# Patient Record
Sex: FEMALE | Race: WHITE | Employment: OTHER | URBAN - METROPOLITAN AREA
[De-identification: names, ages, dates, MRNs, and addresses within clinical notes are randomized per-mention and may not be internally consistent; named-entity substitution may affect disease eponyms.]

---

## 2017-03-28 ENCOUNTER — OFFICE VISIT (OUTPATIENT)
Dept: FAMILY MEDICINE CLINIC | Age: 62
End: 2017-03-28

## 2017-03-28 VITALS
BODY MASS INDEX: 20.99 KG/M2 | SYSTOLIC BLOOD PRESSURE: 110 MMHG | TEMPERATURE: 98.3 F | WEIGHT: 126 LBS | DIASTOLIC BLOOD PRESSURE: 68 MMHG | HEIGHT: 65 IN

## 2017-03-28 DIAGNOSIS — L98.9 PAINFUL SKIN LESION: Primary | ICD-10-CM

## 2017-03-28 PROCEDURE — 99213 OFFICE O/P EST LOW 20 MIN: CPT | Performed by: FAMILY MEDICINE

## 2017-03-28 RX ORDER — GLUCOSAMINE HCL 500 MG
TABLET ORAL EVERY EVENING
COMMUNITY
End: 2021-04-23 | Stop reason: ALTCHOICE

## 2017-03-28 RX ORDER — CEPHALEXIN 500 MG/1
500 CAPSULE ORAL 3 TIMES DAILY
Qty: 21 CAPSULE | Refills: 0 | Status: SHIPPED | OUTPATIENT
Start: 2017-03-28 | End: 2017-04-04

## 2017-07-24 ENCOUNTER — TELEPHONE (OUTPATIENT)
Dept: CASE MANAGEMENT | Age: 62
End: 2017-07-24

## 2018-07-31 ENCOUNTER — OFFICE VISIT (OUTPATIENT)
Dept: FAMILY MEDICINE CLINIC | Age: 63
End: 2018-07-31

## 2018-07-31 VITALS
TEMPERATURE: 98.6 F | BODY MASS INDEX: 19.09 KG/M2 | HEART RATE: 84 BPM | HEIGHT: 65 IN | WEIGHT: 114.6 LBS | DIASTOLIC BLOOD PRESSURE: 74 MMHG | SYSTOLIC BLOOD PRESSURE: 132 MMHG

## 2018-07-31 DIAGNOSIS — Z12.11 COLON CANCER SCREENING: ICD-10-CM

## 2018-07-31 DIAGNOSIS — Z12.2 ENCOUNTER FOR SCREENING FOR LUNG CANCER: ICD-10-CM

## 2018-07-31 DIAGNOSIS — H26.9 CATARACT OF BOTH EYES, UNSPECIFIED CATARACT TYPE: ICD-10-CM

## 2018-07-31 DIAGNOSIS — Z87.891 PERSONAL HISTORY OF TOBACCO USE: ICD-10-CM

## 2018-07-31 DIAGNOSIS — E78.2 MIXED HYPERLIPIDEMIA: ICD-10-CM

## 2018-07-31 DIAGNOSIS — Z72.0 TOBACCO ABUSE: ICD-10-CM

## 2018-07-31 DIAGNOSIS — E04.1 THYROID NODULE: ICD-10-CM

## 2018-07-31 DIAGNOSIS — Z01.818 PREOP EXAMINATION: Primary | ICD-10-CM

## 2018-07-31 PROCEDURE — 99242 OFF/OP CONSLTJ NEW/EST SF 20: CPT | Performed by: FAMILY MEDICINE

## 2018-07-31 PROCEDURE — G0296 VISIT TO DETERM LDCT ELIG: HCPCS | Performed by: FAMILY MEDICINE

## 2018-07-31 ASSESSMENT — ENCOUNTER SYMPTOMS
NAUSEA: 0
VOICE CHANGE: 0
VOMITING: 0
EYE PAIN: 0
COUGH: 0
SORE THROAT: 0
ABDOMINAL DISTENTION: 0
SHORTNESS OF BREATH: 0
TROUBLE SWALLOWING: 0
ABDOMINAL PAIN: 0
CHEST TIGHTNESS: 0

## 2018-07-31 ASSESSMENT — PATIENT HEALTH QUESTIONNAIRE - PHQ9
SUM OF ALL RESPONSES TO PHQ QUESTIONS 1-9: 0
2. FEELING DOWN, DEPRESSED OR HOPELESS: 0
1. LITTLE INTEREST OR PLEASURE IN DOING THINGS: 0
SUM OF ALL RESPONSES TO PHQ9 QUESTIONS 1 & 2: 0

## 2018-07-31 NOTE — PROGRESS NOTES
Subjective:      Patient ID: Rabia Arroyo is a 58 y.o. female. Patient presents with:  Pre-op Exam: Cataract Surgery on 2018 & 8- by Dr. Emmanuel Blount at [facility]. fax [phone], [phone]    She is doing well and no concerns. She is having a problem with her vision because of the cataracts. Allergies:   -- Erythromycin -- Nausea And Vomiting     height is 5' 4.75\" (1.645 m) and weight is 114 lb 9.6 oz (52 kg). Her oral temperature is 98.6 °F (37 °C). Her blood pressure is 132/74 and her pulse is 84. Current smoker. ETOH seldom      Immunization History  Administered            Date(s) Administered    Influenza Virus Vaccine                          2013      Tdap (Boostrix, Adacel)                          2017        Current Outpatient Prescriptions:     TURMERIC PO, Take by mouth    Cholecalciferol (VITAMIN D3) 5000 UNITS TABS, Take by mouth Taking BID    calcium carbonate 600 MG TABS tablet, Take 1 tablet by mouth 2 times daily as needed    Nutritional Supplements (VITAMIN D MAINTENANCE PO), Take  by mouth daily    Past Surgical History:  : APPENDECTOMY  12: CARPAL TUNNEL RELEASE      Comment: right  12: CARPAL TUNNEL RELEASE      Comment: Left  :  SECTION  2006: COLONOSCOPY      Comment: zach dumas  2010: JOINT REPLACEMENT Right      Comment: right hip     No problems with anesthesia    PMH    Hx of colitis  Thyroid nodule             Review of Systems   Constitutional: Negative for appetite change, chills, fever and unexpected weight change. HENT: Negative for sore throat, trouble swallowing and voice change. No loose teeth. Top front teeth are crowns. In process of getting dental implants   Eyes: Negative for pain. Respiratory: Negative for cough, chest tightness and shortness of breath. No hemoptysis   Cardiovascular: Negative for chest pain, palpitations and leg swelling.         No hx of heart disease. She can do heavy work at home lifting boxes, moving furniture and no cp no sob. Gastrointestinal: Negative for abdominal distention, abdominal pain, nausea and vomiting. No dysphagia no gerd. She has hx of loose stools not new no blood   Genitourinary: Negative for difficulty urinating, dysuria, frequency and hematuria. Musculoskeletal:        Hx of neck pain not new. Neurological: Negative for syncope and headaches. No hx of CVA    Hematological: Negative for adenopathy. Does not bruise/bleed easily. No hx of blood clot  No family hx of bleeding       Objective:   Physical Exam   Constitutional: She appears well-developed and well-nourished. No distress. HENT:   Head: Normocephalic and atraumatic. Right Ear: Tympanic membrane and ear canal normal.   Left Ear: Tympanic membrane and ear canal normal.   Nose: Nose normal.   Mouth/Throat: Uvula is midline, oropharynx is clear and moist and mucous membranes are normal. No oral lesions. Eyes: No scleral icterus. Neck: Full passive range of motion without pain. Neck supple. No thyroid mass and no thyromegaly present. Cardiovascular: Normal rate, regular rhythm and normal heart sounds. Exam reveals no gallop and no friction rub. No murmur heard. No edema   Pulmonary/Chest: Effort normal and breath sounds normal. No accessory muscle usage. No tachypnea. No respiratory distress. She has no decreased breath sounds. She has no wheezes. She has no rhonchi. She has no rales. Abdominal: Soft. Normal appearance and bowel sounds are normal. She exhibits no distension, no abdominal bruit, no pulsatile midline mass and no mass. There is no hepatosplenomegaly. There is no tenderness. There is no rigidity and no guarding. Lymphadenopathy:        Head (right side): No submental, no submandibular, no preauricular and no posterior auricular adenopathy present.         Head (left side): No submental, no submandibular, no preauricular and no posterior auricular adenopathy present. She has no cervical adenopathy. Right: No supraclavicular adenopathy present. Left: No supraclavicular adenopathy present. Neurological: She is alert. She displays no tremor. Skin: Skin is warm, dry and intact. She is not diaphoretic. No cyanosis. No pallor. Nails show no clubbing. Psychiatric: She has a normal mood and affect. Her speech is normal.       Assessment:        Diagnosis Orders   1. Preop examination     2. Cataract of both eyes, unspecified cataract type     3. Mixed hyperlipidemia     4. Thyroid nodule     5. Colon cancer screening     6. Tobacco abuse     7. Encounter for screening for lung cancer     8. Personal history of tobacco use  NE VISIT TO DISCUSS LUNG CA SCREEN W LDCT    CT Lung Screening (Annual)       She is ok for the surgery  She is overdue for testing and monitoring      Plan:      Do see dr Tim Hicks for the recheck on the colon  Stop the tobacco  Consider getting the new shingle vaccine called Shingrix. You can do this at the pharmacies  Get the screening cat scan of the chest this year  See dr Mason Smith for a check on the history of the thyroid nodule  Do get back in to the gynecologist for your check up and mammograms          Low Dose CT (LDCT) Lung Screening criteria met   Age 55-77   Pack year smoking >30   Still smoking or less than 15 year since quit   No sign or symptoms of lung cancer   > 11 months since last LDCT     Risks and benefits of lung cancer screening with LDCT scans discussed:    Significance of positive screen - False-positive LDCT results often occur. 95% of all positive results do not lead to a diagnosis of cancer. Usually further imaging can resolve most false-positive results; however, some patients may require invasive procedures.     Over diagnosis risk - 10% to 12% of screen-detected lung cancer cases are over diagnosedthat is, the cancer would not have been detected in the

## 2018-10-01 ENCOUNTER — OFFICE VISIT (OUTPATIENT)
Dept: ORTHOPEDIC SURGERY | Age: 63
End: 2018-10-01
Payer: COMMERCIAL

## 2018-10-01 VITALS
HEART RATE: 98 BPM | BODY MASS INDEX: 19.09 KG/M2 | WEIGHT: 114.6 LBS | HEIGHT: 65 IN | DIASTOLIC BLOOD PRESSURE: 76 MMHG | TEMPERATURE: 99.7 F | RESPIRATION RATE: 16 BRPM | SYSTOLIC BLOOD PRESSURE: 133 MMHG

## 2018-10-01 DIAGNOSIS — Z96.641 HISTORY OF TOTAL RIGHT HIP REPLACEMENT: Primary | ICD-10-CM

## 2018-10-01 PROCEDURE — 99213 OFFICE O/P EST LOW 20 MIN: CPT | Performed by: PHYSICIAN ASSISTANT

## 2018-10-01 NOTE — PROGRESS NOTES
This dictation was done with Bernal Filmson dictation and may contain mechanical errors related to translation. Blood pressure 133/76, pulse 98, temperature 99.7 °F (37.6 °C), temperature source Temporal, resp. rate 16, height 5' 4.75\" (1.645 m), weight 114 lb 9.6 oz (52 kg), not currently breastfeeding. this is a pleasant 77-year-old female who is here in follow-up for her right hip she had a surgery almost 9 years ago had been doing fairly well is very active. She is up and take care of her son has P TSD she also walks her dogs for a she's been moving a lot of ice and the repetitive motion has caused a flareup in her back down her hip sometimes into her groin area is concerning enough that she is here for evaluation and treatment center for x-rays including a AP pelvis and a 2 view right hip. The x-rays show excellent alignment sizing and fit of the prosthesis good fixation of the screws the balls well centered in the cup there is no stress reaction seen in the femur there is some mild osteoarthritis in the lateral aspect of the left hip and through the base of the lumbar spine    On examination this pleasant 4 female in no acute distress she has full range of motion through both hips comfortable internal and external rotation good hip flexion and abduction without pain neurologically she is intact to her right foot with a negative clonus negative for straight leg raise at 40° she has intact dorsal pedal pulses no cutaneous lesions or lymphadenopathy are well-healed incision.     My impression is stable right total hip replacement with some mild radiculopathy and muscle weakness    I did recommend protein supplements and an exercise program even yoga type activities 100 with her overall condition of her right hip and she'll follow up with us in a year

## 2019-03-05 ENCOUNTER — OFFICE VISIT (OUTPATIENT)
Dept: FAMILY MEDICINE CLINIC | Age: 64
End: 2019-03-05
Payer: COMMERCIAL

## 2019-03-05 VITALS
TEMPERATURE: 98.4 F | DIASTOLIC BLOOD PRESSURE: 78 MMHG | WEIGHT: 120 LBS | BODY MASS INDEX: 19.99 KG/M2 | HEART RATE: 76 BPM | HEIGHT: 65 IN | SYSTOLIC BLOOD PRESSURE: 132 MMHG

## 2019-03-05 DIAGNOSIS — Z12.2 ENCOUNTER FOR SCREENING FOR LUNG CANCER: ICD-10-CM

## 2019-03-05 DIAGNOSIS — B07.8 OTHER VIRAL WARTS: ICD-10-CM

## 2019-03-05 DIAGNOSIS — Z87.891 PERSONAL HISTORY OF TOBACCO USE: ICD-10-CM

## 2019-03-05 DIAGNOSIS — M81.0 OSTEOPOROSIS WITHOUT CURRENT PATHOLOGICAL FRACTURE, UNSPECIFIED OSTEOPOROSIS TYPE: ICD-10-CM

## 2019-03-05 DIAGNOSIS — Z12.11 COLON CANCER SCREENING: ICD-10-CM

## 2019-03-05 DIAGNOSIS — Z00.00 WELL ADULT EXAM: Primary | ICD-10-CM

## 2019-03-05 PROCEDURE — 99396 PREV VISIT EST AGE 40-64: CPT | Performed by: FAMILY MEDICINE

## 2019-03-05 RX ORDER — GLUCOSAMINE SULFATE 500 MG
500 CAPSULE ORAL
COMMUNITY
End: 2020-12-10

## 2019-03-05 RX ORDER — PREDNISONE 10 MG/1
TABLET ORAL
COMMUNITY
Start: 2019-02-26 | End: 2020-12-10

## 2019-03-05 ASSESSMENT — ENCOUNTER SYMPTOMS
BLOOD IN STOOL: 0
CHEST TIGHTNESS: 0
VOMITING: 0
ABDOMINAL DISTENTION: 0
CONSTIPATION: 0
SHORTNESS OF BREATH: 0
TROUBLE SWALLOWING: 0
DIARRHEA: 0
COUGH: 0
SORE THROAT: 0
ABDOMINAL PAIN: 0
NAUSEA: 0
VOICE CHANGE: 0

## 2020-12-10 ENCOUNTER — APPOINTMENT (OUTPATIENT)
Dept: GENERAL RADIOLOGY | Age: 65
End: 2020-12-10
Payer: MEDICARE

## 2020-12-10 ENCOUNTER — HOSPITAL ENCOUNTER (EMERGENCY)
Age: 65
Discharge: HOME | End: 2020-12-10
Attending: STUDENT IN AN ORGANIZED HEALTH CARE EDUCATION/TRAINING PROGRAM
Payer: MEDICARE

## 2020-12-10 VITALS
DIASTOLIC BLOOD PRESSURE: 76 MMHG | SYSTOLIC BLOOD PRESSURE: 121 MMHG | BODY MASS INDEX: 19.72 KG/M2 | HEIGHT: 65 IN | RESPIRATION RATE: 16 BRPM | OXYGEN SATURATION: 100 % | HEART RATE: 71 BPM | TEMPERATURE: 97 F | WEIGHT: 118.39 LBS

## 2020-12-10 PROCEDURE — 6370000000 HC RX 637 (ALT 250 FOR IP): Performed by: STUDENT IN AN ORGANIZED HEALTH CARE EDUCATION/TRAINING PROGRAM

## 2020-12-10 PROCEDURE — 99284 EMERGENCY DEPT VISIT MOD MDM: CPT

## 2020-12-10 PROCEDURE — 29515 APPLICATION SHORT LEG SPLINT: CPT

## 2020-12-10 PROCEDURE — 73610 X-RAY EXAM OF ANKLE: CPT

## 2020-12-10 RX ORDER — HYDROCODONE BITARTRATE AND ACETAMINOPHEN 5; 325 MG/1; MG/1
1 TABLET ORAL ONCE
Status: COMPLETED | OUTPATIENT
Start: 2020-12-10 | End: 2020-12-10

## 2020-12-10 RX ORDER — TRAMADOL HYDROCHLORIDE 50 MG/1
50 TABLET ORAL EVERY 6 HOURS PRN
Qty: 12 TABLET | Refills: 0 | Status: SHIPPED | OUTPATIENT
Start: 2020-12-10 | End: 2020-12-13

## 2020-12-10 RX ADMIN — HYDROCODONE BITARTRATE AND ACETAMINOPHEN 1 TABLET: 5; 325 TABLET ORAL at 16:20

## 2020-12-10 ASSESSMENT — PAIN DESCRIPTION - PAIN TYPE
TYPE: ACUTE PAIN
TYPE: ACUTE PAIN

## 2020-12-10 ASSESSMENT — PAIN DESCRIPTION - DESCRIPTORS
DESCRIPTORS: ACHING
DESCRIPTORS: ACHING

## 2020-12-10 ASSESSMENT — PAIN DESCRIPTION - LOCATION
LOCATION: ANKLE
LOCATION: ANKLE

## 2020-12-10 ASSESSMENT — PAIN DESCRIPTION - ORIENTATION
ORIENTATION: RIGHT
ORIENTATION: RIGHT

## 2020-12-10 ASSESSMENT — PAIN SCALES - GENERAL
PAINLEVEL_OUTOF10: 5
PAINLEVEL_OUTOF10: 1
PAINLEVEL_OUTOF10: 3

## 2020-12-10 NOTE — ED TRIAGE NOTES
Pt. Twisted right ankle when [place] park, swelling noted of right ankle, strong pedal pulse present. Ice applied to ankle, has not stepped on foot since injury.

## 2020-12-11 ENCOUNTER — ANESTHESIA EVENT (OUTPATIENT)
Dept: OPERATING ROOM | Age: 65
End: 2020-12-11
Payer: MEDICARE

## 2020-12-11 ENCOUNTER — TELEPHONE (OUTPATIENT)
Dept: ORTHOPEDIC SURGERY | Age: 65
End: 2020-12-11

## 2020-12-11 ENCOUNTER — HOSPITAL ENCOUNTER (OUTPATIENT)
Dept: CT IMAGING | Age: 65
Discharge: HOME | End: 2020-12-11
Payer: MEDICARE

## 2020-12-11 ENCOUNTER — OFFICE VISIT (OUTPATIENT)
Dept: ORTHOPEDIC SURGERY | Age: 65
End: 2020-12-11
Payer: MEDICARE

## 2020-12-11 VITALS — WEIGHT: 118 LBS | TEMPERATURE: 97.3 F | BODY MASS INDEX: 19.66 KG/M2 | HEIGHT: 65 IN

## 2020-12-11 PROBLEM — F17.200 CURRENT SMOKER: Status: ACTIVE | Noted: 2020-12-11

## 2020-12-11 PROCEDURE — 99406 BEHAV CHNG SMOKING 3-10 MIN: CPT | Performed by: ORTHOPAEDIC SURGERY

## 2020-12-11 PROCEDURE — 99203 OFFICE O/P NEW LOW 30 MIN: CPT | Performed by: ORTHOPAEDIC SURGERY

## 2020-12-11 PROCEDURE — 73700 CT LOWER EXTREMITY W/O DYE: CPT

## 2020-12-11 RX ORDER — M-VIT,TX,IRON,MINS/CALC/FOLIC 27MG-0.4MG
1 TABLET ORAL DAILY
COMMUNITY

## 2020-12-11 RX ORDER — OXYCODONE HYDROCHLORIDE AND ACETAMINOPHEN 5; 325 MG/1; MG/1
1 TABLET ORAL EVERY 6 HOURS PRN
Qty: 20 TABLET | Refills: 0 | Status: SHIPPED | OUTPATIENT
Start: 2020-12-11 | End: 2020-12-16

## 2020-12-11 NOTE — ED PROVIDER NOTES
[address]      Pt Name: German Perez  MRN: [id number]  [place] 1955  Date of evaluation: 12/10/2020  Provider: Henry Arriaga MD    CHIEF COMPLAINT       Chief Complaint   Patient presents with    Ankle Injury     right ankle injury when twisted ankle when getting out of the car         HISTORY OF PRESENT ILLNESS   (Location/Symptom, Timing/Onset,Context/Setting, Quality, Duration, Modifying Factors, Severity)  Note limiting factors. German Perez is a 76 y.o. female who presents to the emergency department c/o R ankle pain x 15 min. Onset sudden, pt was exiting the passenger side of a vehicle when she twisted her ankle and fell. Denies head trauma, denies LOC, denies neck pain. Ankle pain was immediate in onset and persistent with associated swelling and decreased ROM 2/2 pain. Denies sensory change/loss. Exacerbated by movement and alleviated by immobilization. Symptoms not otherwise alleviated or exacerbated by other factors. NursingNotes were reviewed. REVIEW OF SYSTEMS    (2-9 systems for level 4, 10 or more for level 5)       Constitutional: No fever or chills. Eye: No visual disturbances. No eye pain. Ear/Nose/Mouth/Throat: No nasal congestion. No sore throat. Respiratory: No cough, No shortness of breath, No sputum production. Cardiovascular: No chest pain. No palpitations. Gastrointestinal: No abdominal pain. No nausea or vomiting  Genitourinary: No dysuria. No hematuria. Hematology/Lymphatics: No bleeding or bruising tendency. Immunologic: No malaise. No swollen glands. Musculoskeletal: No back pain. + joint pain. Integumentary: No rash. No abrasions. Neurologic: No headache. No focal numbness or weakness.       PAST MEDICAL HISTORY     Past Medical History:   Diagnosis Date    Colitis     microscopic         SURGICALHISTORY       Past Surgical History:   Procedure Laterality Date    APPENDECTOMY  2000  CARPAL TUNNEL RELEASE  12    right    CARPAL TUNNEL RELEASE  12    Left     SECTION  1982    COLONOSCOPY  2006    piter, zach    DENTAL SURGERY      JOINT REPLACEMENT Right 2010    right hip          CURRENT MEDICATIONS       Discharge Medication List as of 12/10/2020  5:56 PM      CONTINUE these medications which have NOT CHANGED    Details   Cyanocobalamin (VITAMIN B 12 PO) Take by mouthHistorical Med      Coenzyme Q10 (CO Q 10 PO) Take by mouthHistorical Med      MAGNESIUM PO Take by mouthHistorical Med      Cholecalciferol (VITAMIN D3) 5000 UNITS TABS Take by mouth Taking BIDHistorical Med      Nutritional Supplements (VITAMIN D MAINTENANCE PO) Take  by mouth daily.                ALLERGIES     Erythromycin    FAMILY HISTORY       Family History   Problem Relation Age of Onset    Cancer Other     Diabetes Other     Heart Disease Other     Hypertension Other           SOCIAL HISTORY       Social History     Socioeconomic History    Marital status:      Spouse name: None    Number of children: None    Years of education: None    Highest education level: None   Occupational History    Occupation:    Social Needs    Financial resource strain: None    Food insecurity     Worry: None     Inability: None    Transportation needs     Medical: None     Non-medical: None   Tobacco Use    Smoking status: Current Every Day Smoker     Packs/day: 1.00     Years: 46.00     Pack years: 46.00     Types: Cigarettes     Start date: 1972    Smokeless tobacco: Never Used   Substance and Sexual Activity    Alcohol use: Yes     Comment: seldom    Drug use: No    Sexual activity: None   Lifestyle    Physical activity     Days per week: None     Minutes per session: None    Stress: None   Relationships    Social connections     Talks on phone: None     Gets together: None     Attends Roman [employer] service: None     Active member of club or organization: None Attends meetings of clubs or organizations: None     Relationship status: None    Intimate partner violence     Fear of current or ex partner: None     Emotionally abused: None     Physically abused: None     Forced sexual activity: None   Other Topics Concern    None   Social History Narrative    None       SCREENINGS             PHYSICAL EXAM    (up to 7 for level 4, 8 or more for level 5)     ED Triage Vitals [12/10/20 1601]   BP Temp Temp Source Pulse Resp SpO2 Height Weight   121/64 97 °F (36.1 °C) Oral 67 16 100 % 5' 4.75\" (1.645 m) 118 lb 6.2 oz (53.7 kg)       General: Alert and oriented appropriately for age, No acute distress. Eye: Normal conjunctiva. Pupils equal and reactive. HENT: Oral mucosa is moist.  Respiratory: Respirations even and non-labored. Cardiovascular: Normal rate, Regular rhythm. Gastrointestinal: Soft, Non-tender, Non-distended. Musculoskeletal: + R ankle joint ttp and signficant associated swelling. Compartments otherwise soft and compressible, decreased ankle ROM 2/2 pain. Distal foot and toes pink and well perfused. DP and PT 2+ and equal to c/l extremity. SILT throughout. EHL/FHL 5/5. Fires GS/TA. Integumentary: Warm, Dry. Neurologic: Alert and appropriate for age. No focal deficits. Psychiatric: Cooperative. DIAGNOSTIC RESULTS     RADIOLOGY:   Non-plain filmimages such as CT, Ultrasound and MRI are read by the radiologist. Plain radiographic images are visualized and preliminarily interpreted by the emergency physician with the below findings:      Interpretation per the Radiologist below, if available at the time ofthis note:    XR ANKLE RIGHT (MIN 3 VIEWS)   Final Result   Trimalleolar fracture.                  EMERGENCY DEPARTMENT COURSE and DIFFERENTIAL DIAGNOSIS/MDM:   Vitals:    Vitals:    12/10/20 1601 12/10/20 1708   BP: 121/64 121/76   Pulse: 67 71   Resp: 16 16   Temp: 97 °F (36.1 °C)    TempSrc: Oral    SpO2: 100% 100%   Weight: 118 lb 6.2 oz (53.7 kg) Height: 5' 4.75\" (1.645 m)          Medical decision makin yo F with R ankle pain, swelling and decreased ROM s/p fall. Closed injury, NVI. HDS without other e/o trauma. C/f ankle fx. No ttp along the proximal fibula to suggest [place]. Getting R ankle XR. Pain control with med below. Medications   HYDROcodone-acetaminophen (NORCO) 5-325 MG per tablet 1 tablet (1 tablet Oral Given 12/10/20 1620)     Pain improved with meds and splinting of her trimalleolar ankle fx. Consulted ortho who agreed with plan for splinting and next day follow up. Pt given next day follow up with Dr. Serafin Hess. Educated on signs and sxs of compartment syndrome and neurovascular compromise in lay terms and given return precautions, voiced understanding. Recommended stop excedrin for potential upcoming surgery and given Rx tramadol for pain. Given NWB precautions RLE. Able to ambulate steadily with walker in the ED and maintain NWB precautions. D/c home with prompt ortho follow up. CONSULTS:  IP CONSULT TO ORTHOPEDIC SURGERY    PROCEDURES:  Splint Application    Date/Time: 12/10/2020 10:31 PM  Performed by: Mar Pate MD  Authorized by: Mar Pate MD     Consent:     Consent obtained:  Verbal    Consent given by:  Patient    Risks discussed:  Discoloration, numbness, pain and swelling    Alternatives discussed:  No treatment  Pre-procedure details:     Sensation:  Normal    Skin color:  Pink and well perfused. Procedure details:     Laterality:  Right    Location:  Ankle    Ankle:  R ankle    Splint type:  Short leg    Supplies:  Ortho-Glass  Post-procedure details:     Pain:  Improved    Sensation:  Normal    Skin color:  Pink and well perfused. Patient tolerance of procedure: Tolerated well, no immediate complications               FINAL IMPRESSION      1.  Closed right trimalleolar fracture, initial encounter          DISPOSITION/PLAN   DISPOSITION Decision To Discharge 12/10/2020 05:54:39 PM      PATIENT REFERRED TO:  Marlin Granger MD  [address] [address]  [phone]    In 1 week      Melisa Butler, Lacy [facility] [address] [facility], [address]  [address]  [phone]    In 1 day      [address]  [address] [address]  [phone]    If symptoms worsen      DISCHARGE MEDICATIONS:  Discharge Medication List as of 12/10/2020  5:56 PM      START taking these medications    Details   traMADol (ULTRAM) 50 MG tablet Take 1 tablet by mouth every 6 hours as needed for Pain for up to 3 days. Intended supply: 3 days.  Take lowest dose possible to manage pain, Disp-12 tablet,R-0Print                (Please note that portions of this note were completed with a voice recognition program.Efforts were made to edit the dictations but occasionally words are mis-transcribed.)    Raúl Mujica MD (electronically signed)  Attending Emergency Physician         Raúl Mujica MD  12/10/20 9032

## 2020-12-11 NOTE — LETTER
[address] [facility]  Surgery Scheduling Form:      DEMOGRAPHICS:                                                                                                              .    Patient Name:  Emi Cordova  Patient :  1955   Patient SS#:      Patient Phone:  [phone] (home) [phone] (work) Alt. Patient Phone:    Patient Address:  [facility] [address] [facility]    PCP:  Abby Mike MD  Insurance:    Payor/Plan Subscr  Sex Relation Sub. Ins. ID Effective Group Num   1. [address] [facility] 1955 Female  [id number]                                    [id number]   Insurance ID Number:  DIAGNOSIS & PROCEDURE:                                                                                            .    Diagnosis:   Right ankle trimalleolar fracture  H64.797X  Operation:  Open reduction internal fixation right ankle  [id number]  Location:  [place]  Surgeon:  Radha Shukla MD    SCHEDULING INFORMATION:                                                                                         .    Surgeon's Scheduling Instruction:  2020  Requested Date:   2020 OR Time:  1215 Patient Arrival Time:  8522  OR Time Required:  90  Minutes  Anesthesia:  General    SA Required:  Yes  Equipment:  haile  Mini C-Arm:  No    Standard C-Arm:  Yes  Status:  Outpatient    PAT Required:  Yes  Latex Allergy:  unknown    Defibrilator or Pacemaker:  unknown  Isolation Precautions:  unknown  Comments:                       Meghan Beltran MD 20   BILLING INFORMATION:                                                                                                    .    Procedure:       CPT Code Modifier                  Pre-Certification:

## 2020-12-11 NOTE — PROGRESS NOTES
C-Difficile admission screening and protocol:     * Admitted with diarrhea? no     *Prior history of C-Diff. In last 3 months?no     *Antibiotic use in the past 6-8 weeks? yes-dental work     *Prior hospitalization or nursing home in the last month?  no      Remember. Ivarabia Snare Elisa Snare Safety First! Call before you Fall    Preoperative Screening for Elective Surgery/Invasive Procedures While COVID-19 present in the community     Have you tested positive or have been told to self-isolate for COVID-19 like symptoms within the past 28 days? no   Do you currently have any of the following symptoms?no  o Fever >100.0 F or 99.9 F in immunocompromised patients? o New onset cough, shortness of breath or difficulty breathing?  o New onset sore throat, myalgia (muscle aches and pains), headache, loss of taste/smell or diarrhea?  Have you had a potential exposure to COVID-19 within the past 14 days by:  o Close contact with a confirmed case?no  o Close contact with a healthcare worker,  or essential infrastructure worker (grocery store, [employer], gas station, public utilities or transportation)?no  o Do you reside in a congregate setting such as; skilled nursing facility, adult home, correctional facility, homeless shelter or other institutional setting?no  o Have you had recent travel to a known COVID-19 hotspot? no    Indicate if the patient has a positive screen by answering yes to one or more of the above questions. Patients who test positive or screen positive prior to surgery or on the day of surgery should be evaluated in conjunction with the surgeon/proceduralist/anesthesiologist to determine the urgency of the procedure. [address]  time___1015_________        Surgery time____1215________    Take the following medications with a sip of water: Follow your MD/Surgeons pre-procedure instructions regarding your medications    Do not eat or drink anything after 12:00 midnight prior to your surgery. This includes water chewing gum, mints and ice chips. You may brush your teeth and gargle the morning of your surgery, but do not swallow the water     Please see your family doctor/pediatrician for a history and physical and/or concerning medications. Bring any test results/reports from your physicians office. If you are under the care of a heart doctor or specialist doctor, please be aware that you may be asked to them for clearance    You may be asked to stop blood thinners such as Coumadin, Plavix, Fragmin, Lovenox, etc., or any anti-inflammatories such as:  Aspirin, Ibuprofen, Advil, Naproxen prior to your surgery. We also ask that you stop any OTC medications such as fish oil, vitamin E, glucosamine, garlic, Multivitamins, COQ 10, etc.    We ask that you do not smoke 24 hours prior to surgery  We ask that you do not  drink any alcoholic beverages 24 hours prior to surgery     You must make arrangements for a responsible adult to take you home after your surgery. For your safety you will not be allowed to leave alone or drive yourself home. Your surgery will be cancelled if you do not have a ride home. Also for your safety, it is strongly suggested that someone stay with you the first 24 hours after your surgery. A parent or legal guardian must accompany a child scheduled for surgery and plan to stay at the hospital until the child is discharged. Please do not bring other children with you. For your comfort, please wear simple loose fitting clothing to the hospital.  Please do not bring valuables. Do not wear any make-up or nail polish on your fingers or toes      For your safety, please do not wear any jewelry or body piercing's on the day of surgery. All jewelry must be removed. If you have dentures, they will be removed before going to operating room. For your convenience, we will provide you with a container.     If you wear contact lenses or glasses, they will be removed, please bring a case for them. If you have a living will and a durable power of  for healthcare, please bring in a copy. As part of our patient safety program to minimize surgical site infections, we ask you to do the following:    · Please notify your surgeon if you develop any illness between         now and the  day of your surgery. · This includes a cough, cold, fever, sore throat, nausea,         or vomiting, and diarrhea, etc.  ·  Please notify your surgeon if you experience dizziness, shortness         of breath or blurred vision between now and the time of your surgery. Do not shave your operative site 96 hours prior to surgery. For face and neck surgery, men may use an electric razor 48 hours   prior to surgery. You may shower the night before surgery or the morning of   your surgery with an antibacterial soap. You will need to bring a photo ID and insurance card    [facility] has an onsite pharmacy, would you like to utilize our pharmacy     If you will be staying overnight and use a C-pap machine, please bring   your C-pap to hospital     Our goal is to provide you with excellent care, therefore, visitors will be limited to two(2) in the room at a time so that we may focus on providing this care for you. Please contact pre-admission testing if you have any further questions. [facility] phone number:  [phone] PAT fax number:  [phone]  Please note these are generalized instructions for all surgical cases, you may be provided with more specific instructions according to your surgery.

## 2020-12-11 NOTE — TELEPHONE ENCOUNTER
General Question     Subject: TRAMDOL  PATIENT STATES IT'S NOT WORKING WOULD LIKE SOMETHING ELSE  Patient and /or Facility Request: [address] [facility] Number: [phone]

## 2020-12-11 NOTE — PROGRESS NOTES
CHIEF COMPLAINT: Right ankle pain / trimalleolar fracture. DATE OF INJURY: 12/10/2020    HISTORY:  Ms. Marvin Del Real 76 y.o.  female presents today for the first visit for evaluation of a right ankle injury which occurred when she was walking and tripped out of the passenger side of the car door when she was trying to tee her dog. She rolled her right ankle and was then unable to bear weight. She was first seen and evaluated in [facility], where she was x-rayed, splinted and asked to f/u with Orthopedics. She is complaining of medial & lateral ankle pain and swelling. This is better with elevation and worse with bearing any wt. The pain is sharp and not radiating. No numbness or tingling sensation. Alleviating factors: rest. No other complaint. She is retired. She is a smoker, 1 pack/day.     Past Medical History:   Diagnosis Date    Colitis     microscopic       Past Surgical History:   Procedure Laterality Date    APPENDECTOMY      CARPAL TUNNEL RELEASE  12    right    CARPAL TUNNEL RELEASE  12    Left     SECTION      COLONOSCOPY  2006    zach dumas    DENTAL SURGERY      JOINT REPLACEMENT Right 2010    right hip        Social History     Socioeconomic History    Marital status:      Spouse name: Not on file    Number of children: Not on file    Years of education: Not on file    Highest education level: Not on file   Occupational History    Occupation:    Social Needs    Financial resource strain: Not on file    Food insecurity     Worry: Not on file     Inability: Not on file    Transportation needs     Medical: Not on file     Non-medical: Not on file   Tobacco Use    Smoking status: Current Every Day Smoker     Packs/day: 1.00     Years: 46.00     Pack years: 46.00     Types: Cigarettes     Start date: 1972    Smokeless tobacco: Never Used   Substance and Sexual Activity    Alcohol use: Yes     Comment: seldom  Drug use: No    Sexual activity: Not on file   Lifestyle    Physical activity     Days per week: Not on file     Minutes per session: Not on file    Stress: Not on file   Relationships    Social connections     Talks on phone: Not on file     Gets together: Not on file     Attends Sabianist service: Not on file     Active member of club or organization: Not on file     Attends meetings of clubs or organizations: Not on file     Relationship status: Not on file    Intimate partner violence     Fear of current or ex partner: Not on file     Emotionally abused: Not on file     Physically abused: Not on file     Forced sexual activity: Not on file   Other Topics Concern    Not on file   Social History Narrative    Not on file       Family History   Problem Relation Age of Onset    Cancer Other     Diabetes Other     Heart Disease Other     Hypertension Other        Current Outpatient Medications on File Prior to Visit   Medication Sig Dispense Refill    traMADol (ULTRAM) 50 MG tablet Take 1 tablet by mouth every 6 hours as needed for Pain for up to 3 days. Intended supply: 3 days. Take lowest dose possible to manage pain 12 tablet 0    Cyanocobalamin (VITAMIN B 12 PO) Take by mouth      Coenzyme Q10 (CO Q 10 PO) Take by mouth      MAGNESIUM PO Take by mouth      Cholecalciferol (VITAMIN D3) 5000 UNITS TABS Take by mouth Taking BID      Nutritional Supplements (VITAMIN D MAINTENANCE PO) Take  by mouth daily. No current facility-administered medications on file prior to visit. Pertinent items are noted in HPI  Review of systems reviewed from Patient History Form dated on 12/11/2020 and available in the patient's chart under the Media tab. PHYSICAL EXAMINATION:  Ms. Danis Giles is a very pleasant 76 y.o.  female who presents today in no acute distress, awake, alert, and oriented. She is well dressed, nourished and  groomed. Patient with normal affect.   Height is  5' 4.75\" Educated the patient regarding the hazards of smoking and that it harms their body in many ways. It increases the chance of developing heart disease, lung disease, cancer, and other health problems including poor bone and wound healing. The importance of smoking cessation for optimal bone and wound healing was stressed. This was communicated verbally, 5 Minutes.       Dionicia Frankel, MD

## 2020-12-11 NOTE — TELEPHONE ENCOUNTER
*Per [facility] he states okay to give percocet*        Please advise percocet 5-325 Q6 with quantity 20 pending

## 2020-12-11 NOTE — TELEPHONE ENCOUNTER
CT RIGHT ANKLE approved Auth# [id number]     Patient was informed of the Auth and sent down stairs for her STAT scheduled image

## 2020-12-14 ENCOUNTER — APPOINTMENT (OUTPATIENT)
Dept: GENERAL RADIOLOGY | Age: 65
End: 2020-12-14
Attending: ORTHOPAEDIC SURGERY
Payer: MEDICARE

## 2020-12-14 ENCOUNTER — ANESTHESIA (OUTPATIENT)
Dept: OPERATING ROOM | Age: 65
End: 2020-12-14
Payer: MEDICARE

## 2020-12-14 ENCOUNTER — HOSPITAL ENCOUNTER (OUTPATIENT)
Age: 65
Setting detail: OUTPATIENT SURGERY
Discharge: HOME | End: 2020-12-14
Attending: ORTHOPAEDIC SURGERY | Admitting: ORTHOPAEDIC SURGERY
Payer: MEDICARE

## 2020-12-14 VITALS
RESPIRATION RATE: 14 BRPM | DIASTOLIC BLOOD PRESSURE: 64 MMHG | OXYGEN SATURATION: 99 % | HEIGHT: 65 IN | TEMPERATURE: 97.6 F | BODY MASS INDEX: 19.49 KG/M2 | WEIGHT: 117 LBS | SYSTOLIC BLOOD PRESSURE: 114 MMHG | HEART RATE: 78 BPM

## 2020-12-14 VITALS
RESPIRATION RATE: 8 BRPM | DIASTOLIC BLOOD PRESSURE: 59 MMHG | TEMPERATURE: 95.9 F | OXYGEN SATURATION: 98 % | SYSTOLIC BLOOD PRESSURE: 121 MMHG

## 2020-12-14 LAB — SARS-COV-2, NAAT: NOT DETECTED

## 2020-12-14 PROCEDURE — 6370000000 HC RX 637 (ALT 250 FOR IP): Performed by: ANESTHESIOLOGY

## 2020-12-14 PROCEDURE — 7100000000 HC PACU RECOVERY - FIRST 15 MIN: Performed by: ORTHOPAEDIC SURGERY

## 2020-12-14 PROCEDURE — 2580000003 HC RX 258: Performed by: NURSE ANESTHETIST, CERTIFIED REGISTERED

## 2020-12-14 PROCEDURE — 27829 TREAT LOWER LEG JOINT: CPT | Performed by: NURSE PRACTITIONER

## 2020-12-14 PROCEDURE — C1713 ANCHOR/SCREW BN/BN,TIS/BN: HCPCS | Performed by: ORTHOPAEDIC SURGERY

## 2020-12-14 PROCEDURE — 2580000003 HC RX 258: Performed by: ANESTHESIOLOGY

## 2020-12-14 PROCEDURE — 6360000002 HC RX W HCPCS: Performed by: ORTHOPAEDIC SURGERY

## 2020-12-14 PROCEDURE — 3700000000 HC ANESTHESIA ATTENDED CARE: Performed by: ORTHOPAEDIC SURGERY

## 2020-12-14 PROCEDURE — 6360000002 HC RX W HCPCS: Performed by: NURSE ANESTHETIST, CERTIFIED REGISTERED

## 2020-12-14 PROCEDURE — 27823 TREATMENT OF ANKLE FRACTURE: CPT | Performed by: ORTHOPAEDIC SURGERY

## 2020-12-14 PROCEDURE — 3600000004 HC SURGERY LEVEL 4 BASE: Performed by: ORTHOPAEDIC SURGERY

## 2020-12-14 PROCEDURE — 73600 X-RAY EXAM OF ANKLE: CPT

## 2020-12-14 PROCEDURE — 27829 TREAT LOWER LEG JOINT: CPT | Performed by: ORTHOPAEDIC SURGERY

## 2020-12-14 PROCEDURE — 2720000010 HC SURG SUPPLY STERILE: Performed by: ORTHOPAEDIC SURGERY

## 2020-12-14 PROCEDURE — 2709999900 HC NON-CHARGEABLE SUPPLY: Performed by: ORTHOPAEDIC SURGERY

## 2020-12-14 PROCEDURE — 7100000010 HC PHASE II RECOVERY - FIRST 15 MIN: Performed by: ORTHOPAEDIC SURGERY

## 2020-12-14 PROCEDURE — 7100000001 HC PACU RECOVERY - ADDTL 15 MIN: Performed by: ORTHOPAEDIC SURGERY

## 2020-12-14 PROCEDURE — 27823 TREATMENT OF ANKLE FRACTURE: CPT | Performed by: NURSE PRACTITIONER

## 2020-12-14 PROCEDURE — 3600000014 HC SURGERY LEVEL 4 ADDTL 15MIN: Performed by: ORTHOPAEDIC SURGERY

## 2020-12-14 PROCEDURE — 6360000002 HC RX W HCPCS: Performed by: ANESTHESIOLOGY

## 2020-12-14 PROCEDURE — 3209999900 FLUORO FOR SURGICAL PROCEDURES

## 2020-12-14 PROCEDURE — 7100000011 HC PHASE II RECOVERY - ADDTL 15 MIN: Performed by: ORTHOPAEDIC SURGERY

## 2020-12-14 PROCEDURE — 2580000003 HC RX 258: Performed by: ORTHOPAEDIC SURGERY

## 2020-12-14 PROCEDURE — 3700000001 HC ADD 15 MINUTES (ANESTHESIA): Performed by: ORTHOPAEDIC SURGERY

## 2020-12-14 PROCEDURE — U0002 COVID-19 LAB TEST NON-CDC: HCPCS

## 2020-12-14 PROCEDURE — 2500000003 HC RX 250 WO HCPCS: Performed by: NURSE ANESTHETIST, CERTIFIED REGISTERED

## 2020-12-14 DEVICE — PLATE BNE L80MM 4 H R LAT DST PERIARTC FIBULAR S STL LOK: Type: IMPLANTABLE DEVICE | Site: ANKLE | Status: FUNCTIONAL

## 2020-12-14 DEVICE — SCREW BNE L14MM DIA2.7MM HEX HD DIA2.5MM CANC BIODUR 108C: Type: IMPLANTABLE DEVICE | Site: ANKLE | Status: FUNCTIONAL

## 2020-12-14 DEVICE — SCREW BNE L36MM DIA4MM CANC SELF DRL ST CANN NONLOCKING 1/3: Type: IMPLANTABLE DEVICE | Site: ANKLE | Status: FUNCTIONAL

## 2020-12-14 DEVICE — SCREW BNE L16MM DIA2.7MM HEX HD DIA2.5MM CANC BIODUR 108C: Type: IMPLANTABLE DEVICE | Site: ANKLE | Status: FUNCTIONAL

## 2020-12-14 DEVICE — IMPLANTABLE DEVICE: Type: IMPLANTABLE DEVICE | Site: ANKLE | Status: FUNCTIONAL

## 2020-12-14 DEVICE — SCREW BNE L18MM DIA2.7MM HEX HD DIA2.5MM CANC BIODUR 108C: Type: IMPLANTABLE DEVICE | Site: ANKLE | Status: FUNCTIONAL

## 2020-12-14 DEVICE — SCREW BNE L12MM DIA3.5MM HEX HD DIA2.7MM DST LAT PERIARTC S: Type: IMPLANTABLE DEVICE | Site: ANKLE | Status: FUNCTIONAL

## 2020-12-14 DEVICE — SCREW BNE L14MM DIA3.5MM HD DIA2.7MM CORT PERIARTC S STL ST: Type: IMPLANTABLE DEVICE | Site: ANKLE | Status: FUNCTIONAL

## 2020-12-14 DEVICE — SCREW BNE L50MM DIA3.5MM HD DIA2.7MM LNG PERIARTC ST: Type: IMPLANTABLE DEVICE | Site: ANKLE | Status: FUNCTIONAL

## 2020-12-14 RX ORDER — SODIUM CHLORIDE, SODIUM LACTATE, POTASSIUM CHLORIDE, CALCIUM CHLORIDE 600; 310; 30; 20 MG/100ML; MG/100ML; MG/100ML; MG/100ML
INJECTION, SOLUTION INTRAVENOUS CONTINUOUS PRN
Status: DISCONTINUED | OUTPATIENT
Start: 2020-12-14 | End: 2020-12-14 | Stop reason: SDUPTHER

## 2020-12-14 RX ORDER — CEPHALEXIN 500 MG/1
500 CAPSULE ORAL 4 TIMES DAILY
Qty: 20 CAPSULE | Refills: 0 | Status: SHIPPED | OUTPATIENT
Start: 2020-12-14 | End: 2020-12-19

## 2020-12-14 RX ORDER — PROPOFOL 10 MG/ML
INJECTION, EMULSION INTRAVENOUS PRN
Status: DISCONTINUED | OUTPATIENT
Start: 2020-12-14 | End: 2020-12-14 | Stop reason: SDUPTHER

## 2020-12-14 RX ORDER — DEXAMETHASONE SODIUM PHOSPHATE 4 MG/ML
INJECTION, SOLUTION INTRA-ARTICULAR; INTRALESIONAL; INTRAMUSCULAR; INTRAVENOUS; SOFT TISSUE PRN
Status: DISCONTINUED | OUTPATIENT
Start: 2020-12-14 | End: 2020-12-14 | Stop reason: SDUPTHER

## 2020-12-14 RX ORDER — KETOROLAC TROMETHAMINE 30 MG/ML
INJECTION, SOLUTION INTRAMUSCULAR; INTRAVENOUS PRN
Status: DISCONTINUED | OUTPATIENT
Start: 2020-12-14 | End: 2020-12-14 | Stop reason: SDUPTHER

## 2020-12-14 RX ORDER — FENTANYL CITRATE 50 UG/ML
25 INJECTION, SOLUTION INTRAMUSCULAR; INTRAVENOUS EVERY 5 MIN PRN
Status: DISCONTINUED | OUTPATIENT
Start: 2020-12-14 | End: 2020-12-14 | Stop reason: HOSPADM

## 2020-12-14 RX ORDER — BUPIVACAINE HYDROCHLORIDE 5 MG/ML
INJECTION, SOLUTION EPIDURAL; INTRACAUDAL
Status: DISCONTINUED | OUTPATIENT
Start: 2020-12-14 | End: 2020-12-14 | Stop reason: ALTCHOICE

## 2020-12-14 RX ORDER — FENTANYL CITRATE 50 UG/ML
INJECTION, SOLUTION INTRAMUSCULAR; INTRAVENOUS PRN
Status: DISCONTINUED | OUTPATIENT
Start: 2020-12-14 | End: 2020-12-14 | Stop reason: SDUPTHER

## 2020-12-14 RX ORDER — OXYCODONE HYDROCHLORIDE AND ACETAMINOPHEN 5; 325 MG/1; MG/1
1 TABLET ORAL EVERY 6 HOURS PRN
Qty: 12 TABLET | Refills: 0 | Status: SHIPPED | OUTPATIENT
Start: 2020-12-14 | End: 2020-12-17

## 2020-12-14 RX ORDER — MIDAZOLAM HYDROCHLORIDE 1 MG/ML
INJECTION INTRAMUSCULAR; INTRAVENOUS PRN
Status: DISCONTINUED | OUTPATIENT
Start: 2020-12-14 | End: 2020-12-14 | Stop reason: SDUPTHER

## 2020-12-14 RX ORDER — OXYCODONE HYDROCHLORIDE AND ACETAMINOPHEN 5; 325 MG/1; MG/1
2 TABLET ORAL PRN
Status: COMPLETED | OUTPATIENT
Start: 2020-12-14 | End: 2020-12-14

## 2020-12-14 RX ORDER — SODIUM CHLORIDE 9 MG/ML
INJECTION, SOLUTION INTRAVENOUS CONTINUOUS
Status: DISCONTINUED | OUTPATIENT
Start: 2020-12-14 | End: 2020-12-14 | Stop reason: HOSPADM

## 2020-12-14 RX ORDER — SODIUM CHLORIDE 0.9 % (FLUSH) 0.9 %
10 SYRINGE (ML) INJECTION PRN
Status: DISCONTINUED | OUTPATIENT
Start: 2020-12-14 | End: 2020-12-14 | Stop reason: HOSPADM

## 2020-12-14 RX ORDER — ONDANSETRON 2 MG/ML
INJECTION INTRAMUSCULAR; INTRAVENOUS PRN
Status: DISCONTINUED | OUTPATIENT
Start: 2020-12-14 | End: 2020-12-14 | Stop reason: SDUPTHER

## 2020-12-14 RX ORDER — SODIUM CHLORIDE 0.9 % (FLUSH) 0.9 %
10 SYRINGE (ML) INJECTION EVERY 12 HOURS SCHEDULED
Status: DISCONTINUED | OUTPATIENT
Start: 2020-12-14 | End: 2020-12-14 | Stop reason: HOSPADM

## 2020-12-14 RX ORDER — OXYCODONE HYDROCHLORIDE AND ACETAMINOPHEN 5; 325 MG/1; MG/1
1 TABLET ORAL PRN
Status: COMPLETED | OUTPATIENT
Start: 2020-12-14 | End: 2020-12-14

## 2020-12-14 RX ORDER — LIDOCAINE HYDROCHLORIDE 20 MG/ML
INJECTION, SOLUTION EPIDURAL; INFILTRATION; INTRACAUDAL; PERINEURAL PRN
Status: DISCONTINUED | OUTPATIENT
Start: 2020-12-14 | End: 2020-12-14 | Stop reason: SDUPTHER

## 2020-12-14 RX ORDER — ONDANSETRON 2 MG/ML
4 INJECTION INTRAMUSCULAR; INTRAVENOUS
Status: DISCONTINUED | OUTPATIENT
Start: 2020-12-14 | End: 2020-12-14 | Stop reason: HOSPADM

## 2020-12-14 RX ORDER — GLYCOPYRROLATE 0.2 MG/ML
INJECTION INTRAMUSCULAR; INTRAVENOUS PRN
Status: DISCONTINUED | OUTPATIENT
Start: 2020-12-14 | End: 2020-12-14 | Stop reason: SDUPTHER

## 2020-12-14 RX ORDER — PHENYLEPHRINE HCL IN 0.9% NACL 1 MG/10 ML
SYRINGE (ML) INTRAVENOUS PRN
Status: DISCONTINUED | OUTPATIENT
Start: 2020-12-14 | End: 2020-12-14 | Stop reason: SDUPTHER

## 2020-12-14 RX ADMIN — SODIUM CHLORIDE, SODIUM LACTATE, POTASSIUM CHLORIDE, AND CALCIUM CHLORIDE: .6; .31; .03; .02 INJECTION, SOLUTION INTRAVENOUS at 14:16

## 2020-12-14 RX ADMIN — GLYCOPYRROLATE 0.1 MG: 0.2 INJECTION, SOLUTION INTRAMUSCULAR; INTRAVENOUS at 12:53

## 2020-12-14 RX ADMIN — MIDAZOLAM 1 MG: 1 INJECTION INTRAMUSCULAR; INTRAVENOUS at 12:53

## 2020-12-14 RX ADMIN — MIDAZOLAM 1 MG: 1 INJECTION INTRAMUSCULAR; INTRAVENOUS at 12:58

## 2020-12-14 RX ADMIN — OXYCODONE HYDROCHLORIDE AND ACETAMINOPHEN 1 TABLET: 5; 325 TABLET ORAL at 16:01

## 2020-12-14 RX ADMIN — CEFAZOLIN SODIUM 2 G: 10 INJECTION, POWDER, FOR SOLUTION INTRAVENOUS at 12:53

## 2020-12-14 RX ADMIN — FENTANYL CITRATE 25 MCG: 50 INJECTION, SOLUTION INTRAMUSCULAR; INTRAVENOUS at 15:28

## 2020-12-14 RX ADMIN — Medication 100 MCG: at 13:02

## 2020-12-14 RX ADMIN — LIDOCAINE HYDROCHLORIDE 3 ML: 20 INJECTION, SOLUTION EPIDURAL; INFILTRATION; INTRACAUDAL; PERINEURAL at 12:58

## 2020-12-14 RX ADMIN — HYDROMORPHONE HYDROCHLORIDE 0.5 MG: 1 INJECTION, SOLUTION INTRAMUSCULAR; INTRAVENOUS; SUBCUTANEOUS at 14:59

## 2020-12-14 RX ADMIN — FENTANYL CITRATE 25 MCG: 50 INJECTION INTRAMUSCULAR; INTRAVENOUS at 14:09

## 2020-12-14 RX ADMIN — FENTANYL CITRATE 50 MCG: 50 INJECTION INTRAMUSCULAR; INTRAVENOUS at 12:53

## 2020-12-14 RX ADMIN — ONDANSETRON 4 MG: 2 INJECTION INTRAMUSCULAR; INTRAVENOUS at 14:11

## 2020-12-14 RX ADMIN — HYDROMORPHONE HYDROCHLORIDE 0.5 MG: 1 INJECTION, SOLUTION INTRAMUSCULAR; INTRAVENOUS; SUBCUTANEOUS at 14:50

## 2020-12-14 RX ADMIN — KETOROLAC TROMETHAMINE 15 MG: 30 INJECTION, SOLUTION INTRAMUSCULAR at 13:15

## 2020-12-14 RX ADMIN — PROPOFOL 125 MG: 10 INJECTION, EMULSION INTRAVENOUS at 12:58

## 2020-12-14 RX ADMIN — FENTANYL CITRATE 25 MCG: 50 INJECTION, SOLUTION INTRAMUSCULAR; INTRAVENOUS at 15:18

## 2020-12-14 RX ADMIN — FENTANYL CITRATE 25 MCG: 50 INJECTION INTRAMUSCULAR; INTRAVENOUS at 13:17

## 2020-12-14 RX ADMIN — DEXAMETHASONE SODIUM PHOSPHATE 8 MG: 4 INJECTION, SOLUTION INTRAMUSCULAR; INTRAVENOUS at 13:15

## 2020-12-14 RX ADMIN — SODIUM CHLORIDE: 9 INJECTION, SOLUTION INTRAVENOUS at 10:37

## 2020-12-14 RX ADMIN — SODIUM CHLORIDE: 9 INJECTION, SOLUTION INTRAVENOUS at 12:53

## 2020-12-14 ASSESSMENT — PULMONARY FUNCTION TESTS
PIF_VALUE: 10
PIF_VALUE: 9
PIF_VALUE: 12
PIF_VALUE: 10
PIF_VALUE: 0
PIF_VALUE: 9
PIF_VALUE: 10
PIF_VALUE: 10
PIF_VALUE: 9
PIF_VALUE: 10
PIF_VALUE: 0
PIF_VALUE: 9
PIF_VALUE: 1
PIF_VALUE: 5
PIF_VALUE: 10
PIF_VALUE: 9
PIF_VALUE: 9
PIF_VALUE: 10
PIF_VALUE: 9
PIF_VALUE: 9
PIF_VALUE: 12
PIF_VALUE: 9
PIF_VALUE: 10
PIF_VALUE: 9
PIF_VALUE: 12
PIF_VALUE: 10
PIF_VALUE: 14
PIF_VALUE: 1
PIF_VALUE: 9
PIF_VALUE: 12
PIF_VALUE: 5
PIF_VALUE: 9
PIF_VALUE: 5
PIF_VALUE: 14
PIF_VALUE: 9
PIF_VALUE: 9
PIF_VALUE: 1
PIF_VALUE: 12
PIF_VALUE: 9
PIF_VALUE: 10
PIF_VALUE: 13
PIF_VALUE: 9
PIF_VALUE: 9
PIF_VALUE: 12
PIF_VALUE: 9
PIF_VALUE: 10
PIF_VALUE: 12
PIF_VALUE: 10
PIF_VALUE: 10
PIF_VALUE: 9
PIF_VALUE: 12
PIF_VALUE: 10
PIF_VALUE: 14
PIF_VALUE: 12
PIF_VALUE: 10
PIF_VALUE: 9
PIF_VALUE: 5
PIF_VALUE: 12
PIF_VALUE: 9
PIF_VALUE: 16
PIF_VALUE: 6
PIF_VALUE: 10
PIF_VALUE: 12
PIF_VALUE: 10
PIF_VALUE: 14
PIF_VALUE: 9
PIF_VALUE: 14
PIF_VALUE: 9
PIF_VALUE: 12
PIF_VALUE: 13
PIF_VALUE: 9
PIF_VALUE: 5
PIF_VALUE: 9
PIF_VALUE: 14
PIF_VALUE: 12
PIF_VALUE: 9
PIF_VALUE: 10
PIF_VALUE: 13

## 2020-12-14 ASSESSMENT — PAIN DESCRIPTION - PAIN TYPE
TYPE: SURGICAL PAIN

## 2020-12-14 ASSESSMENT — PAIN DESCRIPTION - FREQUENCY
FREQUENCY: CONTINUOUS

## 2020-12-14 ASSESSMENT — PAIN DESCRIPTION - DESCRIPTORS
DESCRIPTORS: ACHING
DESCRIPTORS: ACHING
DESCRIPTORS: ACHING;CONSTANT
DESCRIPTORS: ACHING;DISCOMFORT;SORE
DESCRIPTORS: ACHING

## 2020-12-14 ASSESSMENT — PAIN DESCRIPTION - LOCATION
LOCATION: ANKLE

## 2020-12-14 ASSESSMENT — PAIN DESCRIPTION - PROGRESSION
CLINICAL_PROGRESSION: GRADUALLY IMPROVING
CLINICAL_PROGRESSION: NOT CHANGED
CLINICAL_PROGRESSION: GRADUALLY IMPROVING
CLINICAL_PROGRESSION: NOT CHANGED

## 2020-12-14 ASSESSMENT — PAIN DESCRIPTION - ONSET
ONSET: ON-GOING

## 2020-12-14 ASSESSMENT — PAIN - FUNCTIONAL ASSESSMENT
PAIN_FUNCTIONAL_ASSESSMENT: PREVENTS OR INTERFERES SOME ACTIVE ACTIVITIES AND ADLS
PAIN_FUNCTIONAL_ASSESSMENT: 0-10

## 2020-12-14 ASSESSMENT — PAIN SCALES - GENERAL
PAINLEVEL_OUTOF10: 6
PAINLEVEL_OUTOF10: 9
PAINLEVEL_OUTOF10: 4
PAINLEVEL_OUTOF10: 6
PAINLEVEL_OUTOF10: 9
PAINLEVEL_OUTOF10: 6
PAINLEVEL_OUTOF10: 6

## 2020-12-14 ASSESSMENT — PAIN DESCRIPTION - ORIENTATION
ORIENTATION: RIGHT

## 2020-12-14 ASSESSMENT — LIFESTYLE VARIABLES: SMOKING_STATUS: 1

## 2020-12-14 ASSESSMENT — ENCOUNTER SYMPTOMS: SHORTNESS OF BREATH: 0

## 2020-12-14 NOTE — ANESTHESIA PRE PROCEDURE
Department of Anesthesiology  Preprocedure Note       Name:  Kala Ahuja   Age:  76 y.o.  :  1955                                          MRN:  [id number]         Date:  2020      Surgeon: Margie Laws):  Radha Reddy MD    Procedure: Procedure(s):  OPEN REDUCTION INTERNAL FIXATION RIGHT ANKLE    Medications prior to admission:   Prior to Admission medications    Medication Sig Start Date End Date Taking? Authorizing Provider   Multiple Vitamins-Minerals (THERAPEUTIC MULTIVITAMIN-MINERALS) tablet Take 1 tablet by mouth daily   Yes Historical Provider, MD   oxyCODONE-acetaminophen (PERCOCET) 5-325 MG per tablet Take 1 tablet by mouth every 6 hours as needed for Pain for up to 5 days. Intended supply: 3 days. Take lowest dose possible to manage pain 20 Yes Radha Reddy MD   Cyanocobalamin (VITAMIN B 12 PO) Take by mouth   Yes Historical Provider, MD   Coenzyme Q10 (CO Q 10 PO) Take by mouth   Yes Historical Provider, MD   MAGNESIUM PO Take by mouth   Yes Historical Provider, MD   Cholecalciferol (VITAMIN D3) 75 MCG (3000 UT) TABS Take by mouth Taking BID    Yes Historical Provider, MD   Nutritional Supplements (VITAMIN D MAINTENANCE PO) Take  by mouth daily. Yes Historical Provider, MD       Current medications:    Current Facility-Administered Medications   Medication Dose Route Frequency Provider Last Rate Last Admin    0.9 % sodium chloride infusion   Intravenous Continuous Andrea Sage MD        sodium chloride flush 0.9 % injection 10 mL  10 mL Intravenous 2 times per day Andrea Sage MD        sodium chloride flush 0.9 % injection 10 mL  10 mL Intravenous PRN Andrea Sage MD        ceFAZolin (ANCEF) 1 g in dextrose 5 % 50 mL IVPB (mini-bag)  1 g Intravenous Once Radha Reddy MD           Allergies:     Allergies   Allergen Reactions    Erythromycin Nausea Only       Problem List:    Patient Active Problem List   Diagnosis Code    Colitis K52.9    Mixed hyperlipidemia E78.2    Depression F32.9    Hip pain, right M25.551    H/O total hip arthroplasty Z96.649    CTS (carpal tunnel syndrome) G56.00    Thyroid nodule E04.1    Osteoporosis M81.0    Current smoker F17.200       Past Medical History:        Diagnosis Date    Absence of teeth     front tooth flipper    Colitis     microscopic    Wears partial dentures     lower       Past Surgical History:        Procedure Laterality Date    APPENDECTOMY  2000    CARPAL TUNNEL RELEASE  12    right    CARPAL TUNNEL RELEASE  12    Left     SECTION  1982    COLONOSCOPY  2006    zach dumas    DENTAL SURGERY      JOINT REPLACEMENT Right 2010    right hip        Social History:    Social History     Tobacco Use    Smoking status: Current Every Day Smoker     Packs/day: 1.00     Years: 46.00     Pack years: 46.00     Types: Cigarettes     Start date: 1972    Smokeless tobacco: Never Used   Substance Use Topics    Alcohol use: Yes     Comment: seldom                                Ready to quit: Not Answered  Counseling given: Not Answered      Vital Signs (Current):   Vitals:    20 1156   Weight: 117 lb (53.1 kg)   Height: 5' 4.75\" (1.645 m)                                              BP Readings from Last 3 Encounters:   12/10/20 121/76   19 132/78   10/01/18 133/76       NPO Status:                            >8hrs                                                       BMI:   Wt Readings from Last 3 Encounters:   20 117 lb (53.1 kg)   20 118 lb (53.5 kg)   12/10/20 118 lb 6.2 oz (53.7 kg)     Body mass index is 19.62 kg/m².     CBC:   Lab Results   Component Value Date    WBC 7.1 2013    RBC 4.20 2013    HGB 13.1 2013    HCT 40.1 2013    MCV 95.5 2013    RDW 14.5 2013     2013       CMP:   Lab Results   Component Value Date     2015    K 3.8 2015     2015    CO2 27 2015 BUN 11 07/16/2015    CREATININE 0.6 07/16/2015    GFRAA >60 07/16/2015    GFRAA >60 05/13/2013    AGRATIO 2.5 07/16/2015    LABGLOM >60 07/16/2015    GLUCOSE 93 07/16/2015    PROT 6.7 07/16/2015    PROT 6.9 11/26/2010    CALCIUM 9.6 07/16/2015    BILITOT 0.3 07/16/2015    ALKPHOS 67 07/16/2015    AST 24 07/16/2015    ALT 13 07/16/2015       POC Tests: No results for input(s): POCGLU, POCNA, POCK, POCCL, POCBUN, POCHEMO, POCHCT in the last 72 hours. Coags:   Lab Results   Component Value Date    PROTIME 13.5 11/29/2010    INR 1.24 11/29/2010    APTT 28.1 11/26/2010       HCG (If Applicable): No results found for: PREGTESTUR, PREGSERUM, HCG, HCGQUANT     ABGs: No results found for: PHART, PO2ART, TMS7OTM, GXL9WZW, BEART, C3MHPLLH     Type & Screen (If Applicable):  Lab Results   Component Value Date    LABABO O 11/26/2010    [address] Positive 11/26/2010       Drug/Infectious Status (If Applicable):  No results found for: HIV, HEPCAB    COVID-19 Screening (If Applicable): No results found for: COVID19      Anesthesia Evaluation  Patient summary reviewed no history of anesthetic complications:   Airway: Mallampati: II  TM distance: >3 FB   Neck ROM: full  Mouth opening: > = 3 FB Dental:    (+) partials      Pulmonary: breath sounds clear to auscultation  (+) current smoker    (-) COPD, asthma, shortness of breath, recent URI and sleep apnea          Patient did not smoke on day of surgery.                  Cardiovascular:        (-) hypertension, valvular problems/murmurs, past MI, CAD, CABG/stent, dysrhythmias,  angina,  CHF and orthopnea      Rhythm: regular  Rate: normal                    Neuro/Psych:   (+) neuromuscular disease:, psychiatric history:depression/anxiety    (-) seizures, TIA, CVA and headaches           GI/Hepatic/Renal:        (-) GERD, PUD, hepatitis, liver disease and no renal disease       Endo/Other:    (+) : arthritis:., .    (-) diabetes mellitus, hypothyroidism, hyperthyroidism Abdominal:           Vascular:                                      Anesthesia Plan      general     ASA 2       Induction: intravenous. MIPS: Postoperative opioids intended and Prophylactic antiemetics administered. Anesthetic plan and risks discussed with patient and spouse. Plan discussed with CRNA. This pre-anesthesia assessment may be used as a history and physical.    DOS STAFF ADDENDUM:    Pt seen and examined, chart reviewed (including anesthesia, drug and allergy history). No interval changes to history and physical examination. Anesthetic plan, risks, benefits, alternatives, and personnel involved discussed with patient. Patient verbalized an understanding and agrees to proceed.       Radha Modi MD  December 14, 2020  10:18 AM      Radha Modi MD   12/14/2020

## 2020-12-14 NOTE — ANESTHESIA POSTPROCEDURE EVALUATION
Department of Anesthesiology  Postprocedure Note    Patient: Emi Cordova  MRN: [id number]  YOB: 1955  Date of evaluation: 12/14/2020  Time:  3:45 PM     Procedure Summary     Date: 12/14/20 Room / Location: [address]    Anesthesia Start: 1253 Anesthesia Stop: 2553    Procedure: OPEN REDUCTION INTERNAL FIXATION RIGHT ANKLE (Right Ankle) Diagnosis: (RIGHT ANKLE TRIMALLEOLAR FRACTURE)    Surgeons: Mickey Iqbal MD Responsible Provider: Edita Best MD    Anesthesia Type: general ASA Status: 2          Anesthesia Type: general    Orlando Phase I: Orlando Score: 10    Orlando Phase II: Orlando Score: 10    Last vitals: Reviewed and per EMR flowsheets.        Anesthesia Post Evaluation    Patient location during evaluation: PACU  Patient participation: complete - patient participated  Level of consciousness: awake and alert  Airway patency: patent  Nausea & Vomiting: no nausea and no vomiting  Complications: no  Cardiovascular status: hemodynamically stable  Respiratory status: acceptable  Hydration status: stable

## 2020-12-14 NOTE — PROGRESS NOTES
Patient admitted to PACU # 12 from OR at 1430 post OPEN REDUCTION INTERNAL FIXATION RIGHT ANKLE  per Dr. Ceola Goldmann. Attached to PACU monitoring system and report received from anesthesia provider. Patient was reported to be hemodynamically stable during procedure. Patient drowsy on admission and RR >10.  Oral airway removed soon after arrival

## 2020-12-14 NOTE — PROGRESS NOTES
Pt dressed. Assisted up to wheelchair and wheeled to bathroom. Pt demonstrated non weight bearing to right ankle.

## 2020-12-14 NOTE — PROGRESS NOTES
Pt voided. Pt states that she only has a few pain pills left at home. Spoke to Dr. Natalya Gilbert and new prescription for Percocet given to pt. Wheeled pt to 's car.

## 2020-12-14 NOTE — H&P
Preoperative H&P Update    The patient's History and Physical in the medical record from 2020 was reviewed by me today. Past Medical History:   Diagnosis Date    Absence of teeth     front tooth flipper    Colitis     microscopic    Wears partial dentures     lower     Past Surgical History:   Procedure Laterality Date    APPENDECTOMY  2000    CARPAL TUNNEL RELEASE  12    right    CARPAL TUNNEL RELEASE  12    Left     SECTION      COLONOSCOPY  2006    zach dumas    DENTAL SURGERY      JOINT REPLACEMENT Right 2010    right hip      No current facility-administered medications on file prior to encounter. Current Outpatient Medications on File Prior to Encounter   Medication Sig Dispense Refill    Multiple Vitamins-Minerals (THERAPEUTIC MULTIVITAMIN-MINERALS) tablet Take 1 tablet by mouth daily      Cyanocobalamin (VITAMIN B 12 PO) Take by mouth      Coenzyme Q10 (CO Q 10 PO) Take by mouth      MAGNESIUM PO Take by mouth      Cholecalciferol (VITAMIN D3) 75 MCG (3000 UT) TABS Take by mouth Taking BID       Nutritional Supplements (VITAMIN D MAINTENANCE PO) Take  by mouth daily. Allergies   Allergen Reactions    Erythromycin Nausea Only      I reviewed the HPI, medications, allergies, reason for surgery, diagnosis and treatment plan and there has been no change. The patient was evaluated by me today. Physical exam findings for this update include: There were no vitals filed for this visit. Airway is intact  Chest: chest clear, no wheezing, rales, normal symmetric air entry, no tachypnea, retractions or cyanosis  Heart: regular rate and rhythm ; heart sounds normal  Findings on exam of the body region where surgery is to be performed include:  Right ankle trimalleolar fracture.     Electronically signed by Lindsey Boone on 2020 at 10:31 AM

## 2020-12-15 NOTE — OP NOTE
[address] [facility]                                OPERATIVE REPORT    PATIENT NAME: Kiersten Mcdaniel                    :        1955  MED REC NO:   [id number]                          ROOM:  ACCOUNT NO:   [de-identified]                           ADMIT DATE: 2020  PROVIDER:     Vlad Richardson MD    DATE OF PROCEDURE:  2020    PRIMARY CARE PHYSICIAN:  Oleksandr Wyatt MD    PREOPERATIVE DIAGNOSES:  1. Right ankle trimalleolar fracture. 2.  Right ankle distal tibiofibular syndesmosis disruption. POSTOPERATIVE DIAGNOSES:  1. Right ankle trimalleolar fracture. 2.  Right ankle distal tibiofibular syndesmosis disruption. OPERATIONS PERFORMED:  1. Open treatment of right ankle trimalleolar fracture with open  reduction and internal fixation including the posterior lip. 2.  Open treatment of right ankle distal tibiofibular syndesmosis  disruption with syndesmosis screw. SURGEON:  Vlad Richardson MD    ASSISTANT:  Tabatha Huerta CNP    ANESTHESIA:  General anesthesia. ESTIMATED BLOOD LOSS:  Minimal.    COMPLICATIONS:  None. TOURNIQUET:  Right upper thigh, 300 mmHg. IMPLANTS USED:  1. Jc distal fibula locking plate. 2.  Jc 4.0 partially threaded cannulated screws for both medial  malleolus as well as the posterior lip. 3.  Jc 3.5 cortical screw for a syndesmosis repair. INDICATIONS:  This is a 57-year-old white female who sustained injury to  her right ankle when she was walking and tripped out of the passenger  side of another car door when she was trying to tee her dog. The  patient sustained a twisting injury to her right ankle. She was seen at  [facility] where she was found to have a trimalleolar ankle  fracture. All risks, benefits, and alternatives were discussed with the  patient. She agreed to proceed with the surgical treatment. OPERATIVE PROCEDURE:  The patient's right ankle was marked. She  received 2 gm of Ancef IV preoperatively. The patient was then brought  to the operating room and underwent general anesthesia. A well-padded  tourniquet was placed, right upper thigh. The right lower extremity was  then prepped and draped in regular sterile routine fashion. A time-out  was called, confirming the patient name, site, and procedure. Esmarch was used for exsanguination and tourniquet was inflated to 300  mmHg. A lateral incision was made over the distal fibula. The fracture  was then exposed and found to be markedly displaced. She had  significant osteoporosis, which added significant challenge to the  procedure. We were carefully able to reduce the fracture anatomically  with the fibula out to appropriate length and we secured with multiple  K-wires. While maintaining the reduction, we put the plate in appropriate  position. We then put one screw in the oblong hole and then we locked  it in place with a total of five distal 2.7 locking screws. We added  one more locking screw in the shaft. At this point, we turned the attention towards the posterior lip. We  were able to reduce it anatomically. While maintaining the reduction,  we made a small stab incision on the anterior aspect of the distal  tibia, careful dissection down protecting the neurovascular bundle. We  then put a wire across the fracture from anterior to posterior. That  stabilized the posterior lip in place. Appropriate-sized cannulated  screw was placed. We achieved good compression across the posterior  lip. At this point, we turned the attention towards the medial side and made  a small stab incision and we placed a K-wire across the fracture, which  was anatomically reduced at this point. A cannulated screw was placed  and that was 4.0 partially threaded cannulated screw.   At this point, we were satisfied with the reduction of the trimalleolar fracture. We then turned the attention towards syndesmosis disruption, which was  reduced manually, and we placed 3.5 cortical screw across the  syndesmosis for a syndesmosis repair. Overall, we were very satisfied  with the anatomic reduction and position of all the screws. At this point, we let the tourniquet down and hemostasis was secured. We irrigated the incisions copiously with normal saline mixed with  gentamicin. We then closed the deep layer with a 2-0 Vicryl, subcu with  a 3-0 Vicryl, and the skin with a 4-0 Monocryl. Steri-Strips were then  applied. Dressing was then applied in the form of Xeroform, 4x4,  sterile Webril and a splint was applied. The patient tolerated the procedure well and was taken to the recovery  in stable condition. Dejuan Perry CNP was 1st Assist given the nature of the procedure that needed advanced assistance. POSTOPERATIVE PLAN:  The patient will be discharged home. She will be  nonweightbearing for at least six weeks, but we can start range of  motion in two weeks. She will need a staged procedure with syndesmosis screw removal four to  five months postoperatively.         Amanda Hughes MD    D: 12/15/2020 8:28:07       T: 12/15/2020 10:56:51     [email or web address]  Job#: [id number]     Doc#: [id number]    CC:  Georgi Guillaume MD

## 2020-12-15 NOTE — BRIEF OP NOTE
Brief Postoperative Note      Patient: Kamille Yang  YOB: 1955  MRN: [id number]    Date of Procedure: 12/14/2020    Pre-Op Diagnosis: RIGHT ANKLE TRIMALLEOLAR FRACTURE, SYNDESMOSIS DISRUPTION. Post-Op Diagnosis: Same       Procedure(s):  OPEN REDUCTION INTERNAL FIXATION RIGHT ANKLE TRIMALLEOLAR FRACTURE, SYNDESMOSIS REPAIR. Surgeon(s):  Derik Goode MD    Assistant: Justin Lewis CNP    Anesthesia: General    Estimated Blood Loss (mL): Minimal    Complications: None    Specimens:   * No specimens in log *    Implants:  Implant Name Type Inv. Item Serial No.  Lot No. LRB No. Used Action   SCREW BNE L14MM DIA3. 5MM HD DIA2.7MM MELINDA PERIARTC S STL ST  SCREW BNE L14MM DIA3. 5MM HD DIA2.7MM MELINDA PERIARTC S STL ST  [employer]-  Right 1 Implanted   PLATE BNE [id number] 4 H R LAT DST PERIARTC FIBULAR S STL LION  PLATE BNE G17CV 4 H R LAT DST PERIARTC FIBULAR S STL LION  [employer]-WD  Right 1 Implanted   SCREW BNE L12MM DIA3. 5MM HEX HD DIA2.7MM DST LAT PERIARTC S  SCREW BNE L12MM DIA3. 5MM HEX HD DIA2.7MM DST LAT PERIARTC S  [employer]-WD  Right 1 Implanted   SCREW BNE L14MM DIA2.7MM HEX HD DIA2.5MM CANC BIODUR 108C  SCREW BNE L14MM DIA2.7MM HEX HD DIA2.5MM CANC BIODUR 108C  [employer] TRAU[employer]-WD  Right 1 Implanted   SCREW BNE L16MM DIA2.7MM HEX HD DIA2.5MM CANC BIODUR 108C  SCREW BNE L16MM DIA2.7MM HEX HD DIA2.5MM CANC BIODUR 108C  [employer] TRAUMA-WD  Right 1 Implanted   SCREW BNE L18MM DIA2.7MM HEX HD DIA2.5MM CANC BIODUR 108C  SCREW BNE L18MM DIA2.7MM HEX HD DIA2.5MM CANC BIODUR 108C  [employer] TRAUMA-WD  Right 3 Implanted   SCREW BNE L50MM DIA3. 5MM HD DIA2. 7MM LNG PERIARTC ST  SCREW BNE L50MM DIA3. 5MM HD DIA2. 7MM LNG PERIARTC ST  [employer] BI[employer] TRAUMA-WD  Right 1 Implanted   SCREW BNE L70MM DIA4MM STD CANC ST SELF DRL NILDA NONLOCKING  SCREW BNE L70MM DIA4MM STD CANC ST SELF DRL NILDA NONLOCKING  [employer]-WD  Right 1 Implanted   SCREW BNE L36MM DIA4MM CANC SELF DRL ST NILDA NONLOCKING 1/3  SCREW BNE L36MM DIA4MM CANC SELF DRL ST NILDA NONLOCKING 1/3  [employer] TRAUMA-WD  Right 1 Implanted         Drains: * No LDAs found *    Findings: SAME    Electronically signed by Derik Goode MD on 12/15/2020 at 8:37 AM

## 2020-12-30 ENCOUNTER — TELEPHONE (OUTPATIENT)
Dept: ORTHOPEDIC SURGERY | Age: 65
End: 2020-12-30

## 2020-12-30 ENCOUNTER — OFFICE VISIT (OUTPATIENT)
Dept: ORTHOPEDIC SURGERY | Age: 65
End: 2020-12-30
Payer: MEDICARE

## 2020-12-30 VITALS — WEIGHT: 117 LBS | BODY MASS INDEX: 19.49 KG/M2 | HEIGHT: 65 IN | TEMPERATURE: 97.3 F

## 2020-12-30 PROCEDURE — L4361 PNEUMA/VAC WALK BOOT PRE OTS: HCPCS | Performed by: NURSE PRACTITIONER

## 2020-12-30 PROCEDURE — 99024 POSTOP FOLLOW-UP VISIT: CPT | Performed by: NURSE PRACTITIONER

## 2020-12-30 PROCEDURE — APPNB15 APP NON BILLABLE TIME 0-15 MINS: Performed by: NURSE PRACTITIONER

## 2020-12-30 NOTE — PROGRESS NOTES
DIAGNOSIS:    1-Right ankle trimalleolar fracture, status post ORIF. 2-Right ankle distal tibiofibular syndesmosis disruption, status post ORIF syndesmosis screw  3-Right ankle posterior malleolus fracture, status post ORIF with cannulated screw    DATE OF SURGERY: 12/14/2020. HISTORY OF PRESENT ILLNESS:  Ms. Andrew Sandoval 76 y.o.  female who came in today for 2 weeks postoperative visit. The patient denies any significant pain in the right ankle. Rates pain a 4/10 VAS moderate, aching, intermittent and are improving. Aggravating factors movement and hanging leg dependent. Alleviating factors elevation and rest. She has been in a splint, and non WB. No numbness or tingling sensation. No fever or Chills. She is a smoker 1 pack/day cigarettes. PHYSICAL EXAMINATION:  The incision healing well. No signs of any erythema or drainage, minimal swelling. She has no pain with the active or passive range of motion of the right ankle, but decrease ROM. She has intact sensation distally, and she is neurovascularly intact. IMAGING:  Three views right ankle taken today in the office showed anatomic alignment of the fracture, plate and screws in good position, no loosening. Ankle mortise is well centered. Syndesmosis screw and cannulated pilon screw intact. IMPRESSION:  2 weeks out from   1-Right ankle trimalleolar fracture, status post ORIF. 2-Right ankle distal tibiofibular syndesmosis disruption, status post ORIF syndesmosis screw  3-Right ankle posterior malleolus fracture, status post ORIF with cannulated screw    PLAN: She placed in a boot, and non WB for 6 weeks. I have told the patient to work on ROM. She was instructed to rest, ice, and elevate. The patient will come back for a follow up in 6 weeks. At that time, we will take 3 views of the right ankle standing. She will likely need a staged procedure for syndesmosis screw removal at 4-5 months.      The patient smokes, and we discussed with the patient the risks of smoking on general health and also on bone and soft tissue healing (delay and non-union), and promised to cut down or stop smoking. Smoking: Educated the patient regarding the hazards of smoking and that it harms their body in many ways. It increases the chance of developing heart disease, lung disease, cancer, and other health problems including poor bone and wound healing. The importance of smoking cessation for optimal bone and wound healing was stressed. This was communicated verbally, 5 Minutes. Procedures    Breg Tall Dominique Walking Boot     Patient was prescribed a Breg Tall Dominique Walking Boot. The right ankle will require stabilization / immobilization from this semi-rigid / rigid orthosis to improve their function. The orthosis will assist in protecting the affected area, provide functional support and facilitate healing. Patient was instructed to progress ambulation  as non weight bearing in the device. The plan of care is to progress the patient to full weight bearing status. The patient was educated and fit by a healthcare professional with expert knowledge and specialization in brace application while under the direct supervision of the physician. Verbal and written instructions for the use of and application of this item were provided. They were instructed to contact the office immediately should the brace result in increased pain, decreased sensation, increased swelling or worsening of the condition.      Saroj Garcia, JESSICA - CNP

## 2021-02-04 ENCOUNTER — TELEPHONE (OUTPATIENT)
Dept: PHARMACY | Facility: CLINIC | Age: 66
End: 2021-02-04

## 2021-02-04 NOTE — TELEPHONE ENCOUNTER
Francisca Covington MD, please see below - would patient benefit from DEXA due to recent fracture? - Noted appears patient may be due for f/u with you also. If appropriate, please order DEXA and have your staff notify patient. Thank you,  Jonelle Echeverria, PharmD, Bal[facility]  Direct: [phone]  Department, toll free: [phone], option 7    ==================================================================  POPUL[facility] CLINICAL PHARMACY REVIEW: RECENT FRACTURE    Porsha Reese is a 76 y.o. old female patient who recently had a right trimalleolar fracture (12/10/20 ED visit). Allergies   Allergen Reactions    Erythromycin Nausea Only     Lab Results   Component Value Date    VITD25 92.0 07/16/2015      Lab Results   Component Value Date    CALCIUM 9.6 07/16/2015    PHOS 4.0 05/13/2013     CrCl cannot be calculated (Patient's most recent lab result is older than the maximum 120 days allowed. ). DEXA 3/21/14 (appears was ordered in 2019 and not completed):  Osteoporosis    FRAX-calculated 10-year fracture probability:   Per WHO calculator: major Osteoporotic = 23% and Hip = 9.0%    Assessment:   - AACE recommends BMD testing in adults who have a fracture at or after age 48; USPSTF and ACP recommend DEXA for women at or after age 72  · 76 y.o. female with recent fracture and may benefit from DEXA to assess current BMD  · Last DEXA 2014 showed osteoporosis; appears alendronate rx @3705-6440 (removed from medication list in 2016 as \"therapy completed\"). DEXA ordered in 2019, but does not appear to have been completed by patient.      Considerations:  - Suggest obtaining DEXA

## 2021-02-10 ENCOUNTER — OFFICE VISIT (OUTPATIENT)
Dept: ORTHOPEDIC SURGERY | Age: 66
End: 2021-02-10

## 2021-02-10 VITALS — WEIGHT: 117 LBS | TEMPERATURE: 97.3 F | BODY MASS INDEX: 19.49 KG/M2 | HEIGHT: 65 IN

## 2021-02-10 DIAGNOSIS — S99.911A INJURY OF RIGHT ANKLE, INITIAL ENCOUNTER: ICD-10-CM

## 2021-02-10 DIAGNOSIS — F17.200 CURRENT SMOKER: ICD-10-CM

## 2021-02-10 DIAGNOSIS — S82.851A CLOSED TRIMALLEOLAR FRACTURE OF RIGHT ANKLE, INITIAL ENCOUNTER: Primary | ICD-10-CM

## 2021-02-10 PROCEDURE — APPNB15 APP NON BILLABLE TIME 0-15 MINS: Performed by: NURSE PRACTITIONER

## 2021-02-10 PROCEDURE — 99024 POSTOP FOLLOW-UP VISIT: CPT | Performed by: NURSE PRACTITIONER

## 2021-02-10 RX ORDER — CEPHALEXIN 500 MG/1
500 CAPSULE ORAL 4 TIMES DAILY
Qty: 40 CAPSULE | Refills: 0 | Status: SHIPPED | OUTPATIENT
Start: 2021-02-10 | End: 2021-02-20

## 2021-02-10 NOTE — TELEPHONE ENCOUNTER
Noted provider reviewed/doned.  Will send patient MyChart message encouraging appt.    =======================================================   For Pharmacy Admin Tracking Only    PHSO: Yes  Total # of Interventions Recommended: 1  - Maintenance Safety Lab Monitoring #: 1  Recommended intervention potential cost savings: 0  Total Interventions Accepted: 0  Time Spent (min): 15

## 2021-02-12 NOTE — PROGRESS NOTES
DIAGNOSIS:    1-Right ankle trimalleolar fracture, status post ORIF. 2-Right ankle distal tibiofibular syndesmosis disruption, status post ORIF syndesmosis screw  3-Right ankle posterior malleolus fracture, status post ORIF with cannulated screw    DATE OF SURGERY: 12/14/2020. HISTORY OF PRESENT ILLNESS:  Ms. Elisabeth Quinn 76 y.o.  female who came in today for 8 weeks postoperative visit. The patient denies any significant pain in the right ankle. Rates pain a 2/10 VAS mild, aching, intermittent and are improving. Aggravating factors walking. Alleviating factors elevation and rest. She has been in a boot, and started WB this week and doing well. No numbness or tingling sensation. No fever or Chills. She is a smoker 1 pack/day cigarettes. PHYSICAL EXAMINATION:  The incision is open in the mid incision with scabs on the proximal and distal ends. Serous drainage. No signs of any erythema, moderate swelling. She has no pain with the active or passive range of motion of the right ankle, but decreased ROM. She has intact sensation distally, and she is neurovascularly intact. IMAGING:  Three views right ankle taken today in the office showed anatomic alignment of the fracture, plate and screws in good position, no loosening. Ankle mortise is well centered. Syndesmosis screw and cannulated pilon screw intact. IMPRESSION:  8 weeks out from   1-Right ankle trimalleolar fracture, status post ORIF. 2-Right ankle distal tibiofibular syndesmosis disruption, status post ORIF syndesmosis screw  3-Right ankle posterior malleolus fracture, status post ORIF with cannulated screw    PLAN: She will be WBAT in the boot, and start aggressive ROM and peroneal strengthening exercise. Off the boot in 1 weeks. Will start her on Keflex for 10 days. The patient will come back for a follow up in 6 weeks or sooner if wound worsens or does not improve. At that time, we will take 3 views of the right ankle standing.

## 2021-03-24 ENCOUNTER — OFFICE VISIT (OUTPATIENT)
Dept: ORTHOPEDIC SURGERY | Age: 66
End: 2021-03-24
Payer: MEDICARE

## 2021-03-24 VITALS
TEMPERATURE: 96.7 F | HEART RATE: 88 BPM | RESPIRATION RATE: 16 BRPM | WEIGHT: 117 LBS | SYSTOLIC BLOOD PRESSURE: 123 MMHG | DIASTOLIC BLOOD PRESSURE: 74 MMHG | HEIGHT: 65 IN | BODY MASS INDEX: 19.49 KG/M2

## 2021-03-24 DIAGNOSIS — F17.200 CURRENT SMOKER: ICD-10-CM

## 2021-03-24 DIAGNOSIS — S82.851A CLOSED TRIMALLEOLAR FRACTURE OF RIGHT ANKLE, INITIAL ENCOUNTER: Primary | ICD-10-CM

## 2021-03-24 PROCEDURE — 99406 BEHAV CHNG SMOKING 3-10 MIN: CPT | Performed by: ORTHOPAEDIC SURGERY

## 2021-03-24 PROCEDURE — 99213 OFFICE O/P EST LOW 20 MIN: CPT | Performed by: ORTHOPAEDIC SURGERY

## 2021-03-24 NOTE — PROGRESS NOTES
Cigarettes     Start date: 7/31/1972    Smokeless tobacco: Never Used   Substance and Sexual Activity    Alcohol use: Yes     Comment: seldom    Drug use: No    Sexual activity: Not on file   Lifestyle    Physical activity     Days per week: Not on file     Minutes per session: Not on file    Stress: Not on file   Relationships    Social connections     Talks on phone: Not on file     Gets together: Not on file     Attends [employer] service: Not on file     Active member of club or organization: Not on file     Attends meetings of clubs or organizations: Not on file     Relationship status: Not on file    Intimate partner violence     Fear of current or ex partner: Not on file     Emotionally abused: Not on file     Physically abused: Not on file     Forced sexual activity: Not on file   Other Topics Concern    Not on file   Social History Narrative    Not on file       Family History   Problem Relation Age of Onset    Alzheimer's Disease Mother     Stroke Father     Cancer Other     Diabetes Other     Heart Disease Other     Hypertension Other        Current Outpatient Medications on File Prior to Visit   Medication Sig Dispense Refill    Multiple Vitamins-Minerals (THERAPEUTIC MULTIVITAMIN-MINERALS) tablet Take 1 tablet by mouth daily      Cyanocobalamin (VITAMIN B 12 PO) Take by mouth      Coenzyme Q10 (CO Q 10 PO) Take by mouth      MAGNESIUM PO Take by mouth      Cholecalciferol (VITAMIN D3) 75 MCG (3000 UT) TABS Take by mouth Taking BID       Nutritional Supplements (VITAMIN D MAINTENANCE PO) Take  by mouth daily. No current facility-administered medications on file prior to visit. Pertinent items are noted in HPI  Review of systems reviewed from Patient History Form dated on 2/11/2021 and available in the patient's chart under the Media tab. No change noted. PHYSICAL EXAMINATION:  Ms. Blanca Soares is a very pleasant 76 y.o.   female who presents today in no acute was communicated verbally, 5 Minutes.       Ignacio Ha MD

## 2021-04-21 ENCOUNTER — TELEPHONE (OUTPATIENT)
Dept: ORTHOPEDIC SURGERY | Age: 66
End: 2021-04-21

## 2021-04-21 ENCOUNTER — OFFICE VISIT (OUTPATIENT)
Dept: ORTHOPEDIC SURGERY | Age: 66
End: 2021-04-21
Payer: MEDICARE

## 2021-04-21 VITALS — WEIGHT: 117 LBS | HEIGHT: 65 IN | BODY MASS INDEX: 19.49 KG/M2

## 2021-04-21 DIAGNOSIS — F17.200 CURRENT SMOKER: ICD-10-CM

## 2021-04-21 DIAGNOSIS — S82.851A CLOSED TRIMALLEOLAR FRACTURE OF RIGHT ANKLE, INITIAL ENCOUNTER: Primary | ICD-10-CM

## 2021-04-21 PROCEDURE — 99214 OFFICE O/P EST MOD 30 MIN: CPT | Performed by: ORTHOPAEDIC SURGERY

## 2021-04-21 PROCEDURE — 99406 BEHAV CHNG SMOKING 3-10 MIN: CPT | Performed by: ORTHOPAEDIC SURGERY

## 2021-04-21 NOTE — PROGRESS NOTES
DIAGNOSIS:    1-Right ankle trimalleolar fracture, status post ORIF. 2-Right ankle distal tibiofibular syndesmosis disruption, status post ORIF syndesmosis screw  3-Right ankle posterior malleolus fracture, status post ORIF with cannulated screw    DATE OF SURGERY: 2020. HISTORY OF PRESENT ILLNESS:  Cortez Rosado 76 y.o.  female who came in today for 4 months postoperative visit. The patient denies any significant pain in the right ankle. Rates pain a 2/10 VAS mild, aching, intermittent and are improving. Aggravating factors walking. Alleviating factors elevation and rest. She has been full WB in regular shoes and doing well. No numbness or tingling sensation. No fever or Chills. She is a smoker 1 pack/day cigarettes.     Past Medical History:   Diagnosis Date    Absence of teeth     front tooth flipper    Colitis     microscopic    Wears partial dentures     lower       Past Surgical History:   Procedure Laterality Date    ANKLE FRACTURE SURGERY Right 2020    OPEN REDUCTION INTERNAL FIXATION RIGHT ANKLE performed by Rodo Sepulveda MD at [place] [address].  12    right    CARPAL TUNNEL RELEASE  12    Left     SECTION      COLONOSCOPY  2006    zach dumas    DENTAL SURGERY      JOINT REPLACEMENT Right     right hip        Social History     Socioeconomic History    Marital status:      Spouse name: Not on file    Number of children: Not on file    Years of education: Not on file    Highest education level: Not on file   Occupational History    Occupation:    Social Needs    Financial resource strain: Not on file    Food insecurity     Worry: Not on file     Inability: Not on file    Transportation needs     Medical: Not on file     Non-medical: Not on file   Tobacco Use    Smoking status: Current Every Day Smoker     Packs/day: 1.00     Years: 46.00     Pack years: 46.00     Types: distress, awake, alert, and oriented. She is well dressed, nourished and  groomed. Patient with normal affect. Height is  5' 4.75\" (1.645 m), weight is 117 lb (53.1 kg), Body mass index is 19.62 kg/m². Resting respiratory rate is 16. The patient walks with no limp. The incision is healed well. No signs of any erythema, minimal swelling. She has no pain with the active or passive range of motion of the right ankle, good ROM. She has intact sensation distally, and she is neurovascularly intact. Good strength, and no instability both upper and lower extremities. Ankle reflex 1+ bilaterally. IMAGING:  Three views right ankle taken today in the office showed anatomic alignment of the fracture, plate and screws in good position, no loosening. Ankle mortise is well centered. Syndesmosis screw and cannulated pilon screw intact. IMPRESSION:     1-Right ankle trimalleolar fracture, status post ORIF. 2-Right ankle distal tibiofibular syndesmosis disruption, status post ORIF syndesmosis screw  3-Right ankle posterior malleolus fracture, status post ORIF with cannulated screw    PLAN: She will be WBAT, and start aggressive ROM and peroneal strengthening exercise. No heavy impact activities. I discussed the risks and benefits of surgery with the patient, including but not limited to infection, bleeding, pain, injury to nerves or blood vessels failure of the surgery and need for additional surgery. All the patient's questions were answered. We discussed an expected post-operative course. she  is understanding of this and wishes to proceed with syndesmosis screw removal.    Plan on surgery Monday at [facility].    The patient smokes, and we discussed with the patient the risks of smoking on general health and also on bone and soft tissue healing (delay and non-union), and promised to cut down or stop smoking.      Smoking: Educated the patient regarding the hazards of smoking and that it harms their body in many ways. It increases the chance of developing heart disease, lung disease, cancer, and other health problems including poor bone and wound healing. The importance of smoking cessation for optimal bone and wound healing was stressed. This was communicated verbally, 5 Minutes.       Avery Johnson MD

## 2021-04-21 NOTE — LETTER
[address] [facility]  Surgery Scheduling Form:      DEMOGRAPHICS:                                                                                                              .    Patient Name:  Fayetta Alpers  Patient :  1955   Patient SS#:      Patient Phone:  [phone] (home) [phone] (work) Alt. Patient Phone:    Patient Address:  [facility] [address] [facility] [address] [facility]  PCP:  Virgil Schirmer, MD  Insurance: ,  Payor/Plan Subscr  Sex Relation Sub. Ins. ID Effective Group Num   1. [id number] [facility] 1955 Female Self [id number]                                   [address]   Insurance ID Number:  DIAGNOSIS & PROCEDURE:                                                                                            .    Diagnosis:   Right ankle painful hardware   T84.84XA  Operation:  Right ankle syndesmosis screw removal    Location:  [facility]  Surgeon:  Neha Whitman MD    SCHEDULING INFORMATION:                                                                                         .    Surgeon's Scheduling Instruction:  2021  Requested Date:   2021 OR Time:  0845 Patient Arrival Time:  0645  OR Time Required:  15  Minutes  Anesthesia:  General    SA Required:  Yes  Equipment:  No equipment   Mini C-Arm:  No    Standard C-Arm:  Yes  Status:  Outpatient    PAT Required:  Yes  Latex Allergy:  unknown    Defibrilator or Pacemaker:  unknown  Isolation Precautions:  unknown  Comments: rapid covid test JOHN Markham MD 21   BILLING INFORMATION:                                                                                                    .    Procedure:       CPT Code Modifier                  Pre-Certification:

## 2021-04-23 ENCOUNTER — ANESTHESIA EVENT (OUTPATIENT)
Dept: OPERATING ROOM | Age: 66
End: 2021-04-23
Payer: MEDICARE

## 2021-04-23 RX ORDER — CALCIUM CITRATE/VITAMIN D3 200MG-6.25
1 TABLET ORAL DAILY
COMMUNITY

## 2021-04-23 RX ORDER — OMEGA-3 FATTY ACIDS CAP DELAYED RELEASE 1000 MG 1000 MG
3000 CAPSULE DELAYED RELEASE ORAL EVERY EVENING
COMMUNITY

## 2021-04-23 RX ORDER — MULTIVIT WITH MINERALS/LUTEIN
250 TABLET ORAL EVERY EVENING
COMMUNITY

## 2021-04-23 RX ORDER — VIT C/VIT D3/E/ZINC/ELDERBERRY 65 MG-3.15
50 TABLET,CHEWABLE ORAL EVERY EVENING
COMMUNITY

## 2021-04-23 RX ORDER — TURMERIC ROOT EXTRACT 500 MG
1000 TABLET ORAL EVERY EVENING
COMMUNITY

## 2021-04-23 RX ORDER — LORATADINE 10 MG/1
10 CAPSULE, LIQUID FILLED ORAL DAILY
COMMUNITY

## 2021-04-23 RX ORDER — FLUTICASONE PROPIONATE 50 MCG
1 SPRAY, SUSPENSION (ML) NASAL DAILY
COMMUNITY

## 2021-04-23 NOTE — PROGRESS NOTES
[address] [facility] time___0645_________        Surgery time___0845_________    Take the following medications with a sip of water: Follow your MD/Surgeons pre-procedure instructions regarding your medications    Do not eat or drink anything after 12:00 midnight prior to your surgery. This includes water chewing gum, mints and ice chips. You may brush your teeth and gargle the morning of your surgery, but do not swallow the water     Please see your family doctor/pediatrician for a history and physical and/or concerning medications. Bring any test results/reports from your physicians office. If you are under the care of a heart doctor or specialist doctor, please be aware that you may be asked to them for clearance    You may be asked to stop blood thinners such as Coumadin, Plavix, Fragmin, Lovenox, etc., or any anti-inflammatories such as:  Aspirin, Ibuprofen, Advil, Naproxen prior to your surgery. We also ask that you stop any OTC medications such as fish oil, vitamin E, glucosamine, garlic, Multivitamins, COQ 10, etc.    We ask that you do not smoke 24 hours prior to surgery  We ask that you do not  drink any alcoholic beverages 24 hours prior to surgery     You must make arrangements for a responsible adult to take you home after your surgery. For your safety you will not be allowed to leave alone or drive yourself home. Your surgery will be cancelled if you do not have a ride home. Also for your safety, it is strongly suggested that someone stay with you the first 24 hours after your surgery. A parent or legal guardian must accompany a child scheduled for surgery and plan to stay at the hospital until the child is discharged. Please do not bring other children with you. For your comfort, please wear simple loose fitting clothing to the hospital.  Please do not bring valuables.     Do not wear any make-up or nail polish on your fingers or toes      For your safety, please do not wear any jewelry or body piercing's on the day of surgery. All jewelry must be removed. If you have dentures, they will be removed before going to operating room. For your convenience, we will provide you with a container. If you wear contact lenses or glasses, they will be removed, please bring a case for them. If you have a living will and a durable power of  for healthcare, please bring in a copy. As part of our patient safety program to minimize surgical site infections, we ask you to do the following:    · Please notify your surgeon if you develop any illness between         now and the  day of your surgery. · This includes a cough, cold, fever, sore throat, nausea,         or vomiting, and diarrhea, etc.  ·  Please notify your surgeon if you experience dizziness, shortness         of breath or blurred vision between now and the time of your surgery. Do not shave your operative site 96 hours prior to surgery. For face and neck surgery, men may use an electric razor 48 hours   prior to surgery. You may shower the night before surgery or the morning of   your surgery with an antibacterial soap. You will need to bring a photo ID and insurance card    [facility] has an onsite pharmacy, would you like to utilize our pharmacy     If you will be staying overnight and use a C-pap machine, please bring   your C-pap to hospital     Our goal is to provide you with excellent care, therefore, visitors will be limited to two(2) in the room at a time so that we may focus on providing this care for you. Please contact pre-admission testing if you have any further questions. [facility] phone number:  [address] PAT fax number:  [phone]  Please note these are generalized instructions for all surgical cases, you may be provided with more specific instructions according to your surgery.

## 2021-04-23 NOTE — PROGRESS NOTES
C-Difficile admission screening and protocol:     * Admitted with diarrhea? YES____    NO___X__     *Prior history of C-Diff. In last 3 months? YES____   NO___X__     *Antibiotic use in the past 6-8 weeks? NO___X___YES______                 If yes which  ANTIBIOTIC AND REASON______     *Prior hospitalization or nursing home in the last month?  YES____   NO___X_

## 2021-04-26 ENCOUNTER — HOSPITAL ENCOUNTER (OUTPATIENT)
Age: 66
Setting detail: OUTPATIENT SURGERY
Discharge: HOME | End: 2021-04-26
Attending: ORTHOPAEDIC SURGERY | Admitting: ORTHOPAEDIC SURGERY
Payer: MEDICARE

## 2021-04-26 ENCOUNTER — ANESTHESIA (OUTPATIENT)
Dept: OPERATING ROOM | Age: 66
End: 2021-04-26
Payer: MEDICARE

## 2021-04-26 ENCOUNTER — APPOINTMENT (OUTPATIENT)
Dept: GENERAL RADIOLOGY | Age: 66
End: 2021-04-26
Attending: ORTHOPAEDIC SURGERY
Payer: MEDICARE

## 2021-04-26 VITALS
SYSTOLIC BLOOD PRESSURE: 94 MMHG | DIASTOLIC BLOOD PRESSURE: 53 MMHG | RESPIRATION RATE: 4 BRPM | OXYGEN SATURATION: 100 %

## 2021-04-26 VITALS
SYSTOLIC BLOOD PRESSURE: 106 MMHG | DIASTOLIC BLOOD PRESSURE: 51 MMHG | HEART RATE: 61 BPM | WEIGHT: 120 LBS | TEMPERATURE: 97.1 F | BODY MASS INDEX: 19.99 KG/M2 | RESPIRATION RATE: 16 BRPM | HEIGHT: 65 IN | OXYGEN SATURATION: 96 %

## 2021-04-26 LAB — SARS-COV-2, NAAT: NOT DETECTED

## 2021-04-26 PROCEDURE — 73600 X-RAY EXAM OF ANKLE: CPT

## 2021-04-26 PROCEDURE — 3600000014 HC SURGERY LEVEL 4 ADDTL 15MIN: Performed by: ORTHOPAEDIC SURGERY

## 2021-04-26 PROCEDURE — 20680 REMOVAL OF IMPLANT DEEP: CPT | Performed by: ORTHOPAEDIC SURGERY

## 2021-04-26 PROCEDURE — 6360000002 HC RX W HCPCS: Performed by: ORTHOPAEDIC SURGERY

## 2021-04-26 PROCEDURE — 7100000011 HC PHASE II RECOVERY - ADDTL 15 MIN: Performed by: ORTHOPAEDIC SURGERY

## 2021-04-26 PROCEDURE — 3209999900 FLUORO FOR SURGICAL PROCEDURES

## 2021-04-26 PROCEDURE — 2580000003 HC RX 258: Performed by: ORTHOPAEDIC SURGERY

## 2021-04-26 PROCEDURE — 2500000003 HC RX 250 WO HCPCS: Performed by: ORTHOPAEDIC SURGERY

## 2021-04-26 PROCEDURE — 6360000002 HC RX W HCPCS: Performed by: NURSE ANESTHETIST, CERTIFIED REGISTERED

## 2021-04-26 PROCEDURE — 87635 SARS-COV-2 COVID-19 AMP PRB: CPT

## 2021-04-26 PROCEDURE — 3700000000 HC ANESTHESIA ATTENDED CARE: Performed by: ORTHOPAEDIC SURGERY

## 2021-04-26 PROCEDURE — 7100000000 HC PACU RECOVERY - FIRST 15 MIN: Performed by: ORTHOPAEDIC SURGERY

## 2021-04-26 PROCEDURE — 7100000001 HC PACU RECOVERY - ADDTL 15 MIN: Performed by: ORTHOPAEDIC SURGERY

## 2021-04-26 PROCEDURE — 2580000003 HC RX 258: Performed by: ANESTHESIOLOGY

## 2021-04-26 PROCEDURE — 3700000001 HC ADD 15 MINUTES (ANESTHESIA): Performed by: ORTHOPAEDIC SURGERY

## 2021-04-26 PROCEDURE — 2500000003 HC RX 250 WO HCPCS: Performed by: NURSE ANESTHETIST, CERTIFIED REGISTERED

## 2021-04-26 PROCEDURE — 2709999900 HC NON-CHARGEABLE SUPPLY: Performed by: ORTHOPAEDIC SURGERY

## 2021-04-26 PROCEDURE — 3600000004 HC SURGERY LEVEL 4 BASE: Performed by: ORTHOPAEDIC SURGERY

## 2021-04-26 PROCEDURE — 7100000010 HC PHASE II RECOVERY - FIRST 15 MIN: Performed by: ORTHOPAEDIC SURGERY

## 2021-04-26 PROCEDURE — 88300 SURGICAL PATH GROSS: CPT

## 2021-04-26 RX ORDER — FENTANYL CITRATE 50 UG/ML
50 INJECTION, SOLUTION INTRAMUSCULAR; INTRAVENOUS EVERY 5 MIN PRN
Status: DISCONTINUED | OUTPATIENT
Start: 2021-04-26 | End: 2021-04-26 | Stop reason: HOSPADM

## 2021-04-26 RX ORDER — HYDROCODONE BITARTRATE AND ACETAMINOPHEN 5; 325 MG/1; MG/1
2 TABLET ORAL PRN
Status: DISCONTINUED | OUTPATIENT
Start: 2021-04-26 | End: 2021-04-26 | Stop reason: HOSPADM

## 2021-04-26 RX ORDER — SODIUM CHLORIDE 9 MG/ML
INJECTION, SOLUTION INTRAVENOUS CONTINUOUS
Status: DISCONTINUED | OUTPATIENT
Start: 2021-04-26 | End: 2021-04-26 | Stop reason: HOSPADM

## 2021-04-26 RX ORDER — CEPHALEXIN 500 MG/1
500 CAPSULE ORAL 4 TIMES DAILY
Qty: 12 CAPSULE | Refills: 0 | Status: SHIPPED | OUTPATIENT
Start: 2021-04-26 | End: 2021-04-29

## 2021-04-26 RX ORDER — ONDANSETRON 2 MG/ML
INJECTION INTRAMUSCULAR; INTRAVENOUS PRN
Status: DISCONTINUED | OUTPATIENT
Start: 2021-04-26 | End: 2021-04-26 | Stop reason: SDUPTHER

## 2021-04-26 RX ORDER — HYDROCODONE BITARTRATE AND ACETAMINOPHEN 5; 325 MG/1; MG/1
1 TABLET ORAL PRN
Status: DISCONTINUED | OUTPATIENT
Start: 2021-04-26 | End: 2021-04-26 | Stop reason: HOSPADM

## 2021-04-26 RX ORDER — DEXAMETHASONE SODIUM PHOSPHATE 4 MG/ML
INJECTION, SOLUTION INTRA-ARTICULAR; INTRALESIONAL; INTRAMUSCULAR; INTRAVENOUS; SOFT TISSUE PRN
Status: DISCONTINUED | OUTPATIENT
Start: 2021-04-26 | End: 2021-04-26 | Stop reason: SDUPTHER

## 2021-04-26 RX ORDER — FENTANYL CITRATE 50 UG/ML
INJECTION, SOLUTION INTRAMUSCULAR; INTRAVENOUS PRN
Status: DISCONTINUED | OUTPATIENT
Start: 2021-04-26 | End: 2021-04-26 | Stop reason: SDUPTHER

## 2021-04-26 RX ORDER — SODIUM CHLORIDE 0.9 % (FLUSH) 0.9 %
10 SYRINGE (ML) INJECTION PRN
Status: DISCONTINUED | OUTPATIENT
Start: 2021-04-26 | End: 2021-04-26 | Stop reason: HOSPADM

## 2021-04-26 RX ORDER — ONDANSETRON 2 MG/ML
4 INJECTION INTRAMUSCULAR; INTRAVENOUS
Status: DISCONTINUED | OUTPATIENT
Start: 2021-04-26 | End: 2021-04-26 | Stop reason: HOSPADM

## 2021-04-26 RX ORDER — LIDOCAINE HYDROCHLORIDE 20 MG/ML
INJECTION, SOLUTION EPIDURAL; INFILTRATION; INTRACAUDAL; PERINEURAL PRN
Status: DISCONTINUED | OUTPATIENT
Start: 2021-04-26 | End: 2021-04-26 | Stop reason: SDUPTHER

## 2021-04-26 RX ORDER — SODIUM CHLORIDE 9 MG/ML
25 INJECTION, SOLUTION INTRAVENOUS PRN
Status: DISCONTINUED | OUTPATIENT
Start: 2021-04-26 | End: 2021-04-26 | Stop reason: HOSPADM

## 2021-04-26 RX ORDER — FENTANYL CITRATE 50 UG/ML
25 INJECTION, SOLUTION INTRAMUSCULAR; INTRAVENOUS EVERY 5 MIN PRN
Status: DISCONTINUED | OUTPATIENT
Start: 2021-04-26 | End: 2021-04-26 | Stop reason: HOSPADM

## 2021-04-26 RX ORDER — MIDAZOLAM HYDROCHLORIDE 1 MG/ML
INJECTION INTRAMUSCULAR; INTRAVENOUS PRN
Status: DISCONTINUED | OUTPATIENT
Start: 2021-04-26 | End: 2021-04-26 | Stop reason: SDUPTHER

## 2021-04-26 RX ORDER — HYDRALAZINE HYDROCHLORIDE 20 MG/ML
5 INJECTION INTRAMUSCULAR; INTRAVENOUS EVERY 10 MIN PRN
Status: DISCONTINUED | OUTPATIENT
Start: 2021-04-26 | End: 2021-04-26 | Stop reason: HOSPADM

## 2021-04-26 RX ORDER — MEPERIDINE HYDROCHLORIDE 25 MG/ML
12.5 INJECTION INTRAMUSCULAR; INTRAVENOUS; SUBCUTANEOUS
Status: DISCONTINUED | OUTPATIENT
Start: 2021-04-26 | End: 2021-04-26 | Stop reason: HOSPADM

## 2021-04-26 RX ORDER — PROMETHAZINE HYDROCHLORIDE 25 MG/ML
6.25 INJECTION, SOLUTION INTRAMUSCULAR; INTRAVENOUS
Status: DISCONTINUED | OUTPATIENT
Start: 2021-04-26 | End: 2021-04-26 | Stop reason: HOSPADM

## 2021-04-26 RX ORDER — PROPOFOL 10 MG/ML
INJECTION, EMULSION INTRAVENOUS PRN
Status: DISCONTINUED | OUTPATIENT
Start: 2021-04-26 | End: 2021-04-26 | Stop reason: SDUPTHER

## 2021-04-26 RX ORDER — BUPIVACAINE HYDROCHLORIDE 5 MG/ML
INJECTION, SOLUTION EPIDURAL; INTRACAUDAL
Status: COMPLETED | OUTPATIENT
Start: 2021-04-26 | End: 2021-04-26

## 2021-04-26 RX ORDER — SODIUM CHLORIDE 0.9 % (FLUSH) 0.9 %
10 SYRINGE (ML) INJECTION EVERY 12 HOURS SCHEDULED
Status: DISCONTINUED | OUTPATIENT
Start: 2021-04-26 | End: 2021-04-26 | Stop reason: HOSPADM

## 2021-04-26 RX ADMIN — SODIUM CHLORIDE: 9 INJECTION, SOLUTION INTRAVENOUS at 07:26

## 2021-04-26 RX ADMIN — MIDAZOLAM 2 MG: 1 INJECTION INTRAMUSCULAR; INTRAVENOUS at 08:45

## 2021-04-26 RX ADMIN — ONDANSETRON 4 MG: 2 INJECTION INTRAMUSCULAR; INTRAVENOUS at 09:05

## 2021-04-26 RX ADMIN — FENTANYL CITRATE 50 MCG: 50 INJECTION INTRAMUSCULAR; INTRAVENOUS at 08:52

## 2021-04-26 RX ADMIN — PROPOFOL 150 MG: 10 INJECTION, EMULSION INTRAVENOUS at 08:52

## 2021-04-26 RX ADMIN — DEXAMETHASONE SODIUM PHOSPHATE 8 MG: 4 INJECTION, SOLUTION INTRAMUSCULAR; INTRAVENOUS at 08:57

## 2021-04-26 RX ADMIN — CEFAZOLIN SODIUM 2 G: 10 INJECTION, POWDER, FOR SOLUTION INTRAVENOUS at 08:45

## 2021-04-26 RX ADMIN — LIDOCAINE HYDROCHLORIDE 80 MG: 20 INJECTION, SOLUTION EPIDURAL; INFILTRATION; INTRACAUDAL; PERINEURAL at 08:52

## 2021-04-26 ASSESSMENT — PULMONARY FUNCTION TESTS
PIF_VALUE: 8
PIF_VALUE: 1
PIF_VALUE: 8
PIF_VALUE: 8
PIF_VALUE: 2
PIF_VALUE: 8
PIF_VALUE: 8
PIF_VALUE: 1
PIF_VALUE: 0
PIF_VALUE: 8

## 2021-04-26 ASSESSMENT — LIFESTYLE VARIABLES: SMOKING_STATUS: 1

## 2021-04-26 ASSESSMENT — PAIN SCALES - GENERAL: PAINLEVEL_OUTOF10: 0

## 2021-04-26 ASSESSMENT — ENCOUNTER SYMPTOMS: SHORTNESS OF BREATH: 0

## 2021-04-26 ASSESSMENT — PAIN - FUNCTIONAL ASSESSMENT: PAIN_FUNCTIONAL_ASSESSMENT: 0-10

## 2021-04-26 NOTE — BRIEF OP NOTE
Brief Postoperative Note      Patient: Libby Baca  YOB: 1955  MRN: [id number]    Date of Procedure: 4/26/2021    Pre-Op Diagnosis: RIGHT ANKLE PAINFUL HARDWARE    Post-Op Diagnosis: Same       Procedure(s):  RIGHT ANKLE SYNDESMOSIS SCREW REMOVAL    Surgeon(s):  Maria Del Carmen Mehta MD    Assistant: Raymond Escobedo CNP    Anesthesia: General    Estimated Blood Loss (mL): Minimal    Complications: None    Specimens:   ID Type Source Tests Collected by Time Destination   A : HARDWARE RIGHT ANKLE Tissue Tissue SURGICAL PATHOLOGY Maria Del Carmen Mehta MD 4/26/2021 0830        Implants:  * No implants in log *      Drains: * No LDAs found *    Findings: Same    Electronically signed by Maria Del Carmen Mehta MD on 4/26/2021 at 6:32 PM

## 2021-04-26 NOTE — PROGRESS NOTES
Patient admitted to PACU from OR. Patient asleep. Resp easy unlabored on 2LNC with SaO2 100%. Monitor in SR. Right ankle ace wrap dressing dry and intact. Ice pack on. VSS. IV patent to left forearm.

## 2021-04-26 NOTE — ANESTHESIA POSTPROCEDURE EVALUATION
Department of Anesthesiology  Postprocedure Note    Patient: Ashely Narvaez  MRN: [id number]  YOB: 1955  Date of evaluation: 4/26/2021  Time:  12:17 PM     Procedure Summary     Date: 04/26/21 Room / Location: [address]    Anesthesia Start: 0978 Anesthesia Stop: 0661    Procedure: RIGHT ANKLE SYNDESMOSIS SCREW REMOVAL (Right ) Diagnosis: (RIGHT ANKLE PAINFUL HARDWARE)    Surgeons: Radha Martinez MD Responsible Provider: Shane Echevarria MD    Anesthesia Type: general ASA Status: 2          Anesthesia Type: general    Orlando Phase I: Orlando Score: 10    Orlando Phase II: Orlando Score: 10    Last vitals: Reviewed and per EMR flowsheets.        Anesthesia Post Evaluation    Patient location during evaluation: PACU  Patient participation: complete - patient participated  Level of consciousness: awake and alert  Pain score: 3  Airway patency: patent  Nausea & Vomiting: no nausea and no vomiting  Complications: no  Cardiovascular status: blood pressure returned to baseline  Respiratory status: acceptable  Hydration status: euvolemic

## 2021-04-26 NOTE — PROGRESS NOTES
Patient resting comfortably. Resp easy unlabored on room air O2 with SaO2 96%. Patient denies C/O pain or nausea. Right ankle dressing dry and intact with ice pack on and FOB elevated. VSS. IV patent. Patient stable to transfer to ACU for phase II.

## 2021-04-26 NOTE — ANESTHESIA PRE PROCEDURE
Department of Anesthesiology  Preprocedure Note       Name:  Cr Dobson   Age:  76 y.o.  :  1955                                          MRN:  [id number]         Date:  2021      Surgeon: Ok Floor):  Adolfo Patel MD    Procedure: Procedure(s):  RIGHT ANKLE SYNDESMOSIS SCREW REMOVAL    Medications prior to admission:   Prior to Admission medications    Medication Sig Start Date End Date Taking? Authorizing Provider   loratadine (CLARITIN) 10 MG capsule Take 10 mg by mouth daily    Historical Provider, MD   fluticasone (FLONASE) 50 MCG/ACT nasal spray 1 spray by Each Nostril route daily    Historical Provider, MD   Multiple Minerals-Vitamins (CITRACAL MAXIMUM PLUS) TABS Take 1 tablet by mouth daily    Historical Provider, MD   NONFORMULARY daily Protanbim--OTC supplement for alzhiemers    Historical Provider, MD   Ascorbic Acid (VITAMIN C) 250 MG tablet Take 250 mg by mouth every evening    Historical Provider, MD   Turmeric 500 MG TABS Take 1,000 mg by mouth every evening    Historical Provider, MD   Vitamin A 2400 MCG (8000 UT) TABS Take by mouth every evening    Historical Provider, MD   Cholecalciferol (VITAMIN D3) 125 MCG (5000 UT) TABS Take by mouth every evening    Historical Provider, MD   Omega-3 Fatty Acids (FISH OIL) 1000 MG CPDR Take 3,000 mg by mouth every evening    Historical Provider, MD   Misc Natural Products (AIRBORNE ELDERBERRY) CHEW Take 50 mg by mouth every evening    Historical Provider, MD   Multiple Vitamins-Minerals (THERAPEUTIC MULTIVITAMIN-MINERALS) tablet Take 1 tablet by mouth daily    Historical Provider, MD   Cyanocobalamin (VITAMIN B 12 PO) Take 5,000 mcg by mouth every evening     Historical Provider, MD   Coenzyme Q10 (CO Q 10 PO) Take 100 mg by mouth every evening     Historical Provider, MD   MAGNESIUM PO Take 500 mg by mouth daily     Historical Provider, MD       Current medications:    No current facility-administered medications for this visit.       No Use    Smoking status: Current Every Day Smoker     Packs/day: 1.00     Years: 46.00     Pack years: 46.00     Types: Cigarettes     Start date: 7/31/1972    Smokeless tobacco: Never Used   Substance Use Topics    Alcohol use: Yes     Comment: seldom                                Ready to quit: Not Answered  Counseling given: Not Answered      Vital Signs (Current): There were no vitals filed for this visit. BP Readings from Last 3 Encounters:   04/26/21 116/68   03/24/21 123/74   12/14/20 (!) 121/59       NPO Status:     >8hrs                                                       BMI:   Wt Readings from Last 3 Encounters:   04/26/21 120 lb (54.4 kg)   04/21/21 117 lb (53.1 kg)   03/24/21 117 lb (53.1 kg)     There is no height or weight on file to calculate BMI.    CBC:   Lab Results   Component Value Date    WBC 7.1 05/13/2013    RBC 4.20 05/13/2013    HGB 13.1 05/13/2013    HCT 40.1 05/13/2013    MCV 95.5 05/13/2013    RDW 14.5 05/13/2013     05/13/2013       CMP:   Lab Results   Component Value Date     07/16/2015    K 3.8 09/18/2015     07/16/2015    CO2 27 07/16/2015    BUN 11 07/16/2015    CREATININE 0.6 07/16/2015    GFRAA >60 07/16/2015    GFRAA >60 05/13/2013    AGRATIO 2.5 07/16/2015    LABGLOM >60 07/16/2015    GLUCOSE 93 07/16/2015    PROT 6.7 07/16/2015    PROT 6.9 11/26/2010    CALCIUM 9.6 07/16/2015    BILITOT 0.3 07/16/2015    ALKPHOS 67 07/16/2015    AST 24 07/16/2015    ALT 13 07/16/2015       POC Tests: No results for input(s): POCGLU, POCNA, POCK, POCCL, POCBUN, POCHEMO, POCHCT in the last 72 hours.     Coags:   Lab Results   Component Value Date    PROTIME 13.5 11/29/2010    INR 1.24 11/29/2010    APTT 28.1 11/26/2010       HCG (If Applicable): No results found for: PREGTESTUR, PREGSERUM, HCG, HCGQUANT     ABGs: No results found for: PHART, PO2ART, KCT3EMC, BER4UTV, BEART, Y3VMKRBG     Type & Screen (If Applicable):  Lab Results Component Value Date    LABABO O 11/26/2010    [address] Positive 11/26/2010       Drug/Infectious Status (If Applicable):  No results found for: HIV, HEPCAB    COVID-19 Screening (If Applicable):   Lab Results   Component Value Date    COVID19 Not Detected 12/14/2020         Anesthesia Evaluation  Patient summary reviewed no history of anesthetic complications:   Airway: Mallampati: II  TM distance: >3 FB   Neck ROM: full  Mouth opening: > = 3 FB Dental:    (+) partials      Pulmonary: breath sounds clear to auscultation  (+) current smoker    (-) COPD, asthma, shortness of breath, recent URI and sleep apnea          Patient did not smoke on day of surgery. Cardiovascular:        (-) hypertension, valvular problems/murmurs, past MI, CAD, CABG/stent, dysrhythmias,  angina,  CHF and orthopnea      Rhythm: regular  Rate: normal                    Neuro/Psych:   (+) neuromuscular disease:, psychiatric history:depression/anxiety    (-) seizures, TIA, CVA and headaches           GI/Hepatic/Renal:        (-) GERD, PUD, hepatitis, liver disease and no renal disease       Endo/Other:    (+) : arthritis:., .    (-) diabetes mellitus, hypothyroidism, hyperthyroidism               Abdominal:           Vascular:                                          Anesthesia Plan      general     ASA 2       Induction: intravenous. MIPS: Postoperative opioids intended and Prophylactic antiemetics administered. Anesthetic plan and risks discussed with patient. Plan discussed with CRNA. This pre-anesthesia assessment may be used as a history and physical.    DOS STAFF ADDENDUM:    Pt seen and examined, chart reviewed (including anesthesia, drug and allergy history). No interval changes to history and physical examination. Anesthetic plan, risks, benefits, alternatives, and personnel involved discussed with patient. Patient verbalized an understanding and agrees to proceed.       Quintin Alford MD  April 26, 2021  7:19 AM      Vicki Monroy MD   4/26/2021

## 2021-04-26 NOTE — H&P
Preoperative H&P Update    The patient's History and Physical in the medical record from 2021 was reviewed by me today. Past Medical History:   Diagnosis Date    Absence of teeth     front tooth flipper    Arthritis     hands    Colitis     microscopic    Wears glasses     reading    Wears partial dentures     lower     Past Surgical History:   Procedure Laterality Date    ANKLE FRACTURE SURGERY Right 2020    OPEN REDUCTION INTERNAL FIXATION RIGHT ANKLE performed by Tde Brunson MD at [place] [address].  12    right    CARPAL TUNNEL RELEASE  12    Left     SECTION      COLONOSCOPY  2006    zach dumas    DENTAL SURGERY      EYE SURGERY Bilateral     cataract removal    JOINT REPLACEMENT Right 2010    right hip      No current facility-administered medications on file prior to encounter.       Current Outpatient Medications on File Prior to Encounter   Medication Sig Dispense Refill    loratadine (CLARITIN) 10 MG capsule Take 10 mg by mouth daily      fluticasone (FLONASE) 50 MCG/ACT nasal spray 1 spray by Each Nostril route daily      Multiple Minerals-Vitamins (CITRACAL MAXIMUM PLUS) TABS Take 1 tablet by mouth daily      NONFORMULARY daily Protanbim--OTC supplement for alzhiemers      Ascorbic Acid (VITAMIN C) 250 MG tablet Take 250 mg by mouth every evening      Turmeric 500 MG TABS Take 1,000 mg by mouth every evening      Vitamin A 2400 MCG (8000 UT) TABS Take by mouth every evening      Cholecalciferol (VITAMIN D3) 125 MCG (5000 UT) TABS Take by mouth every evening      Omega-3 Fatty Acids (FISH OIL) 1000 MG CPDR Take 3,000 mg by mouth every evening      Misc Natural Products (AIRBORNE ELDERBERRY) CHEW Take 50 mg by mouth every evening      Multiple Vitamins-Minerals (THERAPEUTIC MULTIVITAMIN-MINERALS) tablet Take 1 tablet by mouth daily      Cyanocobalamin (VITAMIN B 12 PO) Take 5,000 mcg by mouth every evening       Coenzyme Q10 (CO Q 10 PO) Take 100 mg by mouth every evening       MAGNESIUM PO Take 500 mg by mouth daily          Allergies   Allergen Reactions    Erythromycin Nausea Only      I reviewed the HPI, medications, allergies, reason for surgery, diagnosis and treatment plan and there has been no change. The patient was evaluated by me today. Physical exam findings for this update include:    Vitals:    04/26/21 0709   BP: 116/68   Pulse: 65   Resp: 17   Temp: 97.6 °F (36.4 °C)   SpO2: 96%     Airway is intact  Chest: chest clear, no wheezing, rales, normal symmetric air entry, no tachypnea, retractions or cyanosis  Heart: regular rate and rhythm ; heart sounds normal  Findings on exam of the body region where surgery is to be performed include:  Right ankle retained syndesmosis screw.     Electronically signed by Adrian Muller on 4/26/2021 at 8:15 AM

## 2021-04-27 NOTE — OP NOTE
[address] [place]lell[place] [address]                                OPERATIVE REPORT    PATIENT NAME: Briseida Chinchilla                    :        1955  MED REC NO:   [id number]                          ROOM:  ACCOUNT NO:   [de-identified]                           ADMIT DATE: 2021  PROVIDER:     Yovana Cifuentes MD      DATE OF PROCEDURE:  2021    PRIMARY CARE PROVIDER:  Lucie Aguirre MD    PREOPERATIVE DIAGNOSIS:  Right ankle retained deep implant/syndesmosis  screw. POSTOPERATIVE DIAGNOSIS:  Right ankle retained deep implant/syndesmosis  screw. OPERATION PERFORMED:  Removal of deep implant, right ankle syndesmosis  screw. SURGEON:  Yovana Cifuentes MD    ASSISTANT:  Tivis Prader, CNP    ANESTHESIA:  General anesthesia. ESTIMATED BLOOD LOSS:  Minimal.    COMPLICATIONS:  None. TOURNIQUET:  Right upper calf, 250 mmHg. INDICATIONS:  This is a 50-year-old white female who is still fairly  active who sustained a right ankle trimalleolar fracture with  syndesmosis disruption in 2020. She is scheduled for a staged  procedure with right ankle syndesmosis screw removal .  All risks,  benefits, and alternatives were discussed with the patient and she  agreed to proceed with surgical treatment. OPERATIVE PROCEDURE:  The patient's right ankle was marked. She  received 2 gm of Ancef IV preoperatively. The patient was then brought  to the operating room and underwent general anesthesia. A well-padded  tourniquet was placed, right upper calf. The right lower extremity was  then prepped and draped in regular sterile routine fashion. A time-out  was called confirming the patient's name, site, and procedure. Esmarch was used for exsanguination and tourniquet was inflated to 250  mmHg. The site of the screw was confirmed with the mini C-arm. We then  made a small stab incision centered over the screw site.   Careful

## 2021-04-28 ENCOUNTER — TELEPHONE (OUTPATIENT)
Dept: INTERNAL MEDICINE CLINIC | Age: 66
End: 2021-04-28

## 2021-04-28 NOTE — TELEPHONE ENCOUNTER
[facility] Trans[facility] Initial Follow Up Call    Outreach made within 2 business days of discharge: Yes    Patient: Elsa Isaacs Patient : 1955   MRN: <[id number]>  Reason for Admission: There are no discharge diagnoses documented for the most recent discharge. Discharge Date: 21       Spoke with: Left voicemail for patient to call office with questions, concerns and to schedule HFU.     Discharge department/facility: [facility]    Scheduled appointment with PCP within 7-14 days    Follow Up  Future Appointments   Date Time Provider Lacy Guo   2021  2:00 PM MD MITA Bowles MA

## 2021-05-12 ENCOUNTER — OFFICE VISIT (OUTPATIENT)
Dept: ORTHOPEDIC SURGERY | Age: 66
End: 2021-05-12

## 2021-05-12 VITALS — HEIGHT: 64 IN | TEMPERATURE: 98.4 F | WEIGHT: 120 LBS | BODY MASS INDEX: 20.49 KG/M2

## 2021-05-12 DIAGNOSIS — S82.851A CLOSED TRIMALLEOLAR FRACTURE OF RIGHT ANKLE, INITIAL ENCOUNTER: Primary | ICD-10-CM

## 2021-05-12 DIAGNOSIS — S93.431D SYNDESMOTIC DISRUPTION OF RIGHT ANKLE, SUBSEQUENT ENCOUNTER: ICD-10-CM

## 2021-05-12 PROCEDURE — 99024 POSTOP FOLLOW-UP VISIT: CPT | Performed by: NURSE PRACTITIONER

## 2021-05-13 NOTE — PROGRESS NOTES
DIAGNOSIS:  Right ankle hardware removal, syndesmosis screw. DATE OF SURGERY: 4/26/2021. HISTORY OF PRESENT ILLNESS:  Ms. Carmichael Jossie 76 y.o.  female who came in today for 2 weeks postoperative visit. The patient denies any significant pain in the right ankle. She rates her pain a 1/10 VAS. Pain is mild achy and intermittent and improving. She has been WBAT. No numbness or tingling sensation. No fever or Chills. She is a smoker. PHYSICAL EXAMINATION:  The incision healing well . No signs of any erythema or drainage, minimal swelling. She has no pain with the active or passive range of motion of the right ankle, good ROM. She has intact sensation distally, and she is neurovascularly intact. IMAGING:  Three views right ankle taken today in the office showed hardware syndesmosis screw removal, no fracture. The remaining hardware is intact with no signs of failure. IMPRESSION:  2 weeks out from right ankle syndesmosis screw removal and doing very well. PLAN: She can go back to normal activity with no heavy impact activities for 6 weeks. The patient will come back for a follow up in 3 months. At that time, we will take 3 views of the right ankle.     Nancy Kendall, JESSICA - CNP

## 2022-05-25 NOTE — PROGRESS NOTES
[facility] ENDOSCOPY COLONOSCOPY PRE-OPERATIVE INSTRUCTIONS    Procedure date___5/31/22______Arrival time___1000_________        Surgery time____1100________       Clear liquids the day before the procedure. Do not eat or drink anything within 5 hours of your procedure. This includes water chewing gum, mints and ice chips. You may brush your teeth and gargle the morning of your surgery, but do not swallow the water    You may be asked to stop blood thinners such as Coumadin, Plavix, Fragmin, Lovenox, etc., or any anti-inflammatories such as:  Aspirin, Ibuprofen, Advil, Naproxen prior to your procedure. We also ask that you stop any OTC medications such as fish oil, vitamin E, glucosamine, garlic, Multivitamins, COQ 10, etc.    You must make arrangements for a responsible adult to arrive with you and stay in our waiting area during your procedure. They will also need to take you home after your procedure. For your safety you will not be allowed to leave alone or drive yourself home. Also for your safety, it is strongly suggested that someone stay with you the first 24 hours after your procedure. For your comfort, please wear simple loose fitting clothing to the center. Please do not bring valuables. If you have a living will and a durable power of  for healthcare, please bring in a copy.      You will need to bring a photo ID and insurance card    Our goal is to provide you with excellent care so if you have any questions, please contact us at the [facility] at [phone]         Please note these are generalized instructions for all colonoscopy cases, you may be provided with more specific instructions if necessary

## 2022-05-27 ENCOUNTER — ANESTHESIA EVENT (OUTPATIENT)
Dept: ENDOSCOPY | Age: 67
End: 2022-05-27
Payer: MEDICARE

## 2022-05-31 ENCOUNTER — ANESTHESIA (OUTPATIENT)
Dept: ENDOSCOPY | Age: 67
End: 2022-05-31
Payer: MEDICARE

## 2022-05-31 ENCOUNTER — HOSPITAL ENCOUNTER (OUTPATIENT)
Age: 67
Setting detail: OUTPATIENT SURGERY
Discharge: HOME | End: 2022-05-31
Attending: INTERNAL MEDICINE | Admitting: INTERNAL MEDICINE
Payer: MEDICARE

## 2022-05-31 VITALS
SYSTOLIC BLOOD PRESSURE: 122 MMHG | OXYGEN SATURATION: 97 % | HEART RATE: 73 BPM | TEMPERATURE: 97 F | HEIGHT: 64 IN | RESPIRATION RATE: 16 BRPM | DIASTOLIC BLOOD PRESSURE: 61 MMHG | BODY MASS INDEX: 19.46 KG/M2 | WEIGHT: 114 LBS

## 2022-05-31 DIAGNOSIS — R19.7 DIARRHEA, UNSPECIFIED TYPE: ICD-10-CM

## 2022-05-31 PROCEDURE — 3609010300 HC COLONOSCOPY W/BIOPSY SINGLE/MULTIPLE: Performed by: INTERNAL MEDICINE

## 2022-05-31 PROCEDURE — 3700000000 HC ANESTHESIA ATTENDED CARE: Performed by: INTERNAL MEDICINE

## 2022-05-31 PROCEDURE — 6360000002 HC RX W HCPCS: Performed by: NURSE ANESTHETIST, CERTIFIED REGISTERED

## 2022-05-31 PROCEDURE — 3700000001 HC ADD 15 MINUTES (ANESTHESIA): Performed by: INTERNAL MEDICINE

## 2022-05-31 PROCEDURE — 2709999900 HC NON-CHARGEABLE SUPPLY: Performed by: INTERNAL MEDICINE

## 2022-05-31 PROCEDURE — 2580000003 HC RX 258: Performed by: ANESTHESIOLOGY

## 2022-05-31 PROCEDURE — 7100000011 HC PHASE II RECOVERY - ADDTL 15 MIN: Performed by: INTERNAL MEDICINE

## 2022-05-31 PROCEDURE — 7100000010 HC PHASE II RECOVERY - FIRST 15 MIN: Performed by: INTERNAL MEDICINE

## 2022-05-31 PROCEDURE — 88305 TISSUE EXAM BY PATHOLOGIST: CPT

## 2022-05-31 RX ORDER — PROPOFOL 10 MG/ML
INJECTION, EMULSION INTRAVENOUS PRN
Status: DISCONTINUED | OUTPATIENT
Start: 2022-05-31 | End: 2022-05-31 | Stop reason: SDUPTHER

## 2022-05-31 RX ORDER — SODIUM CHLORIDE 0.9 % (FLUSH) 0.9 %
5-40 SYRINGE (ML) INJECTION PRN
Status: DISCONTINUED | OUTPATIENT
Start: 2022-05-31 | End: 2022-05-31 | Stop reason: HOSPADM

## 2022-05-31 RX ORDER — SODIUM CHLORIDE 9 MG/ML
INJECTION, SOLUTION INTRAVENOUS PRN
Status: DISCONTINUED | OUTPATIENT
Start: 2022-05-31 | End: 2022-05-31 | Stop reason: HOSPADM

## 2022-05-31 RX ORDER — SODIUM CHLORIDE 0.9 % (FLUSH) 0.9 %
5-40 SYRINGE (ML) INJECTION EVERY 12 HOURS SCHEDULED
Status: DISCONTINUED | OUTPATIENT
Start: 2022-05-31 | End: 2022-05-31 | Stop reason: HOSPADM

## 2022-05-31 RX ORDER — PROPOFOL 10 MG/ML
INJECTION, EMULSION INTRAVENOUS CONTINUOUS PRN
Status: DISCONTINUED | OUTPATIENT
Start: 2022-05-31 | End: 2022-05-31 | Stop reason: SDUPTHER

## 2022-05-31 RX ADMIN — PROPOFOL 80 MG: 10 INJECTION, EMULSION INTRAVENOUS at 11:18

## 2022-05-31 RX ADMIN — PROPOFOL 150 MCG/KG/MIN: 10 INJECTION, EMULSION INTRAVENOUS at 11:18

## 2022-05-31 RX ADMIN — SODIUM CHLORIDE: 9 INJECTION, SOLUTION INTRAVENOUS at 11:13

## 2022-05-31 ASSESSMENT — PAIN - FUNCTIONAL ASSESSMENT: PAIN_FUNCTIONAL_ASSESSMENT: NONE - DENIES PAIN

## 2022-05-31 ASSESSMENT — ENCOUNTER SYMPTOMS: SHORTNESS OF BREATH: 0

## 2022-05-31 ASSESSMENT — LIFESTYLE VARIABLES: SMOKING_STATUS: 1

## 2022-05-31 NOTE — ANESTHESIA PRE PROCEDURE
Department of Anesthesiology  Preprocedure Note       Name:  Amol Hernandez   Age:  78 y.o.  :  1955                                          MRN:  [id number]         Date:  2022      Surgeon: Malgorzata Adkins):  Jayden Nogueira MD    Procedure: Procedure(s):  COLONOSCOPY DIAGNOSTIC    Medications prior to admission:   Prior to Admission medications    Medication Sig Start Date End Date Taking?  Authorizing Provider   Aspirin-Acetaminophen-Caffeine (EXCEDRIN PO) Take by mouth   Yes Historical Provider, MD   loratadine (CLARITIN) 10 MG capsule Take 10 mg by mouth daily    Historical Provider, MD   fluticasone (FLONASE) 50 MCG/ACT nasal spray 1 spray by Each Nostril route daily    Historical Provider, MD   Multiple Minerals-Vitamins (CITRACAL MAXIMUM PLUS) TABS Take 1 tablet by mouth daily    Historical Provider, MD   NONFORMULARY daily Protanbim--OTC supplement for alzhiemers    Historical Provider, MD   Ascorbic Acid (VITAMIN C) 250 MG tablet Take 250 mg by mouth every evening    Historical Provider, MD   Turmeric 500 MG TABS Take 1,000 mg by mouth every evening    Historical Provider, MD   Vitamin A 2400 MCG (8000 UT) TABS Take by mouth every evening    Historical Provider, MD   Cholecalciferol (VITAMIN D3) 125 MCG (5000 UT) TABS Take by mouth every evening    Historical Provider, MD   Omega-3 Fatty Acids (FISH OIL) 1000 MG CPDR Take 3,000 mg by mouth every evening    Historical Provider, MD   Misc Natural Products (AIRBORNE ELDERBERRY) CHEW Take 50 mg by mouth every evening    Historical Provider, MD   Multiple Vitamins-Minerals (THERAPEUTIC MULTIVITAMIN-MINERALS) tablet Take 1 tablet by mouth daily    Historical Provider, MD   Coenzyme Q10 (CO Q 10 PO) Take 100 mg by mouth every evening     Historical Provider, MD   MAGNESIUM PO Take 500 mg by mouth daily     Historical Provider, MD       Current medications:    Current Facility-Administered Medications   Medication Dose Route Frequency Provider Last Rate Last Admin    sodium chloride flush 0.9 % injection 5-40 mL  5-40 mL IntraVENous 2 times per day Cassidy Ivory MD        sodium chloride flush 0.9 % injection 5-40 mL  5-40 mL IntraVENous PRN Cassidy Ivory MD        0.9 % sodium chloride infusion   IntraVENous PRN Cassidy Ivory MD           Allergies: Allergies   Allergen Reactions    Erythromycin Nausea Only       Problem List:    Patient Active Problem List   Diagnosis Code    Colitis K52.9    Mixed hyperlipidemia E78.2    Depression F32. A    Hip pain, right M25.551    H/O total hip arthroplasty Z96.649    CTS (carpal tunnel syndrome) G56.00    Thyroid nodule E04.1    Osteoporosis M81.0    Current smoker F17.200    Closed trimalleolar fracture of right ankle S82.851A    Syndesmotic disruption of right ankle S93.431A    Retained orthopedic hardware Z96.9       Past Medical History:        Diagnosis Date    Absence of teeth     front tooth flipper    Arthritis     hands    Colitis     microscopic    Wears glasses     reading    Wears partial dentures     lower       Past Surgical History:        Procedure Laterality Date    ANKLE FRACTURE SURGERY Right 2020    OPEN REDUCTION INTERNAL FIXATION RIGHT ANKLE performed by Mat Donaldson MD at [address] [place] [address] Right 2021    RIGHT ANKLE SYNDESMOSIS SCREW REMOVAL performed by Mat Donaldson MD at [address]  12    right    CARPAL TUNNEL RELEASE  12    Left     SECTION  1982    COLONOSCOPY  2006    zach dumas    DENTAL SURGERY      EYE SURGERY Bilateral     cataract removal    JOINT REPLACEMENT Right     right hip        Social History:    Social History     Tobacco Use    Smoking status: Current Every Day Smoker     Packs/day: 1.00     Years: 46.00     Pack years: 46.00     Types: Cigarettes     Start date: 1972    Smokeless tobacco: Never Used   Substance Use Topics    Alcohol use: Yes     Comment: seldom                                Ready to quit: Not Answered  Counseling given: Not Answered      Vital Signs (Current):   Vitals:    05/25/22 1318 05/31/22 1011   BP:  133/74   Pulse:  89   Resp:  16   Temp:  98.3 °F (36.8 °C)   TempSrc:  Temporal   SpO2:  98%   Weight: 118 lb (53.5 kg) 114 lb (51.7 kg)   Height: 5' 4.5\" (1.638 m) 5' 4\" (1.626 m)                                              BP Readings from Last 3 Encounters:   05/31/22 133/74   04/26/21 (!) 94/53   04/26/21 (!) 106/51       NPO Status: Time of last liquid consumption: 0430                        Time of last solid consumption: 1830                        Date of last liquid consumption: 05/31/22                        Date of last solid food consumption: 05/29/22    BMI:   Wt Readings from Last 3 Encounters:   05/31/22 114 lb (51.7 kg)   05/12/21 120 lb (54.4 kg)   04/26/21 120 lb (54.4 kg)     Body mass index is 19.57 kg/m². CBC:   Lab Results   Component Value Date    WBC 7.1 05/13/2013    RBC 4.20 05/13/2013    HGB 13.1 05/13/2013    HCT 40.1 05/13/2013    MCV 95.5 05/13/2013    RDW 14.5 05/13/2013     05/13/2013       CMP:   Lab Results   Component Value Date     07/16/2015    K 3.8 09/18/2015     07/16/2015    CO2 27 07/16/2015    BUN 11 07/16/2015    CREATININE 0.6 07/16/2015    GFRAA >60 07/16/2015    GFRAA >60 05/13/2013    AGRATIO 2.5 07/16/2015    LABGLOM >60 07/16/2015    GLUCOSE 93 07/16/2015    PROT 6.7 07/16/2015    PROT 6.9 11/26/2010    CALCIUM 9.6 07/16/2015    BILITOT 0.3 07/16/2015    ALKPHOS 67 07/16/2015    AST 24 07/16/2015    ALT 13 07/16/2015       POC Tests: No results for input(s): POCGLU, POCNA, POCK, POCCL, POCBUN, POCHEMO, POCHCT in the last 72 hours.     Coags:   Lab Results   Component Value Date    PROTIME 13.5 11/29/2010    INR 1.24 11/29/2010    APTT 28.1 11/26/2010       HCG (If Applicable): No results found for: PREGTESTUR, PREGSERUM, HCG, HCGQUANT     ABGs: No results found for: PHART, PO2ART, MEJ8QTT, NRB0MKO, BEART, Z7UVJZGR     Type & Screen (If Applicable):  Lab Results   Component Value Date    LABABO O 11/26/2010    LABRH Positive 11/26/2010       Drug/Infectious Status (If Applicable):  No results found for: HIV, HEPCAB    COVID-19 Screening (If Applicable):   Lab Results   Component Value Date    COVID19 Not Detected 04/26/2021           Anesthesia Evaluation  Patient summary reviewed no history of anesthetic complications:   Airway: Mallampati: II  TM distance: >3 FB   Neck ROM: full  Mouth opening: > = 3 FB   Dental: normal exam   (+) upper dentures and partials      Pulmonary:normal exam  breath sounds clear to auscultation  (+) current smoker    (-) shortness of breath          Patient did not smoke on day of surgery. ROS comment: Tobacco 1.5ppd   Cardiovascular:  Exercise tolerance: good (>4 METS),   (+) hyperlipidemia        Rhythm: regular  Rate: normal                    Neuro/Psych:   (+) depression/anxiety             GI/Hepatic/Renal:            ROS comment: colitis. Endo/Other:    (+) : arthritis:., .                 Abdominal:             Vascular: negative vascular ROS. Other Findings:           Anesthesia Plan      MAC     ASA 2       Induction: intravenous. Anesthetic plan and risks discussed with patient. Plan discussed with CRNA.     Attending anesthesiologist reviewed and agrees with Brandin Campbell MD   5/31/2022

## 2022-05-31 NOTE — ANESTHESIA POSTPROCEDURE EVALUATION
Department of Anesthesiology  Postprocedure Note    Patient: Malik Walker  MRN: [id number]  YOB: 1955  Date of evaluation: 5/31/2022  Time:  11:39 AM     Procedure Summary     Date: 05/31/22 Room / Location: [address] [place] [address]    Anesthesia Start: 8712 Anesthesia Stop: 9061    Procedure: COLONOSCOPY WITH BIOPSY (N/A ) Diagnosis:       Diarrhea, unspecified type      (Diarrhea, unspecified type)    Surgeons: Rosemarie Ocasio MD Responsible Provider: Luis Antonio Abreu MD    Anesthesia Type: MAC ASA Status: 2          Anesthesia Type: No value filed. Orlando Phase I: Orlando Score: 10    [place] Phase II:      Last vitals: Reviewed and per EMR flowsheets.        Anesthesia Post Evaluation    Patient location during evaluation: PACU  Patient participation: complete - patient participated  Level of consciousness: awake  Airway patency: patent  Nausea & Vomiting: no nausea  Complications: no  Cardiovascular status: hemodynamically stable  Respiratory status: acceptable  Hydration status: euvolemic  Multimodal analgesia pain management approach

## 2022-05-31 NOTE — H&P
Gastroenterology Outpatient History and Physical     Patient: Carlota Ivan MRN: [id number] Sex: female   YOB: 1955 Age: 78 y.o.  Location: [address] [place] [address]    Date:2022  Primary Care Physician: Oleksandr Wyatt MD         Patient: Carlota Ivan    Physician: Catherine Meneses MD    History of Present Illness: colon cancer screening, chronic diarrhea  Review of Systems:  Weight Loss: No  Dysphagia: No  Dyspepsia: No  History:  Past Medical History:   Diagnosis Date    Absence of teeth     front tooth flipper    Arthritis     hands    Colitis     microscopic    Wears glasses     reading    Wears partial dentures     lower      Past Surgical History:   Procedure Laterality Date    ANKLE FRACTURE SURGERY Right 2020    OPEN REDUCTION INTERNAL FIXATION RIGHT ANKLE performed by Ruby Robison MD at [address] [place] [address] Right 2021    RIGHT ANKLE SYNDESMOSIS SCREW REMOVAL performed by Ruby Robison MD at [place] [address].  12    right   [address]  12    Left     SECTION      COLONOSCOPY  2006    zach dumas    DENTAL SURGERY      EYE SURGERY Bilateral     cataract removal    JOINT REPLACEMENT Right     right hip       Social History     Socioeconomic History    Marital status:      Spouse name: None    Number of children: None    Years of education: None    Highest education level: None   Occupational History    Occupation:    Tobacco Use    Smoking status: Current Every Day Smoker     Packs/day: 1.00     Years: 46.00     Pack years: 46.00     Types: Cigarettes     Start date: 1972    Smokeless tobacco: Never Used   Vaping Use    Vaping Use: Never used   Substance and Sexual Activity    Alcohol use: Yes     Comment: seldom    Drug use: Yes     Types: Marijuana Kale Muir)    Sexual activity: Not Currently   Other Topics Concern    None   Social History Narrative    None     Social Determinants of Health     Financial Resource Strain:     Difficulty of Paying Living Expenses: Not on file   Food Insecurity:     Worried About Running Out of Food in the Last Year: Not on file    Nikki of Food in the Last Year: Not on file   Transportation Needs:     Lack of Transportation (Medical): Not on file    Lack of Transportation (Non-Medical): Not on file   Physical Activity:     Days of Exercise per Week: Not on file    Minutes of Exercise per Session: Not on file   Stress:     Feeling of Stress : Not on file   Social Connections:     Frequency of Communication with Friends and Family: Not on file    Frequency of Social Gatherings with Friends and Family: Not on file    Attends Anabaptist Services: Not on file    Active Member of [address] [place] [address] [employer] or Organizations: Not on file    Attends Club or Organization Meetings: Not on file    Marital Status: Not on file   Intimate Partner Violence:     Fear of Current or Ex-Partner: Not on file    Emotionally Abused: Not on file    Physically Abused: Not on file    Sexually Abused: Not on file   Housing Stability:     Unable to Pay for Housing in the Last Year: Not on file    Number of [place] in the Last Year: Not on file    Unstable Housing in the Last Year: Not on file      Family History   Problem Relation Age of Onset    Alzheimer's Disease Mother     Stroke Father     Cancer Other     Diabetes Other     Heart Disease Other     Hypertension Other      Allergies: Allergies   Allergen Reactions    Erythromycin Nausea Only     Medications:   Prior to Admission medications    Medication Sig Start Date End Date Taking?  Authorizing Provider   Aspirin-Acetaminophen-Caffeine (EXCEDRIN PO) Take by mouth   Yes Historical Provider, MD   loratadine (CLARITIN) 10 MG capsule Take 10 mg by mouth daily    Historical Provider, MD   fluticasone (FLONASE) 50 MCG/ACT nasal spray 1 spray by Each Nostril route daily Historical Provider, MD   Multiple Minerals-Vitamins (CITRACAL MAXIMUM PLUS) TABS Take 1 tablet by mouth daily    Historical Provider, MD   NONFORMULARY daily Protanbim--OTC supplement for alzhiemers    Historical Provider, MD   Ascorbic Acid (VITAMIN C) 250 MG tablet Take 250 mg by mouth every evening    Historical Provider, MD   Turmeric 500 MG TABS Take 1,000 mg by mouth every evening    Historical Provider, MD   Vitamin A 2400 MCG (8000 UT) TABS Take by mouth every evening    Historical Provider, MD   Cholecalciferol (VITAMIN D3) 125 MCG (5000 UT) TABS Take by mouth every evening    Historical Provider, MD   Omega-3 Fatty Acids (FISH OIL) 1000 MG CPDR Take 3,000 mg by mouth every evening    Historical Provider, MD   Misc Natural Products (AIRBORNE ELDERBERRY) CHEW Take 50 mg by mouth every evening    Historical Provider, MD   Multiple Vitamins-Minerals (THERAPEUTIC MULTIVITAMIN-MINERALS) tablet Take 1 tablet by mouth daily    Historical Provider, MD   Coenzyme Q10 (CO Q 10 PO) Take 100 mg by mouth every evening     Historical Provider, MD   MAGNESIUM PO Take 500 mg by mouth daily     Historical Provider, MD       Vital Signs: /74   Pulse 89   Temp 98.3 °F (36.8 °C) (Temporal)   Resp 16   Ht 5' 4\" (1.626 m)   Wt 114 lb (51.7 kg)   SpO2 98%   BMI 19.57 kg/m²   Physical Exam:   Heart: regular   Lungs: non-labored breathing  Mental status:  Alert and oriented    ASA score:  ASA 2 - Patient with mild systemic disease with no functional limitations{  Mallimpati score:  2     Planned Procedure: colonoscopy    Planned Sedation: Monitored anesthesia.     Signed By: Sharlene Kelly MD   May 31, 2022

## 2022-05-31 NOTE — OP NOTE
COLONOSCOPY     Patient: Hong Foss MRN: [id number]   YOB: 1955 Age: 78 y.o. Sex: female       Admitting Physician: Sol Mcdermott     Primary Care Physician: Danika Samuels MD      DATE OF PROCEDURE: 5/31/2022  PROCEDURE: Colonoscopy    PREOPERATIVE DIAGNOSIS: Colon Cancer screening. Diarrhea, unspecified type  HPI: This is a 78y.o. year old female who presents today for colon cancer screening and screening colonoscopy. Patient has a history of microscopic colitis and has chronic diarrhea    ENDOSCOPIST: Gianna Barboza MD    POSTOPERATIVE DIAGNOSIS:    1. Hemorrhoids  2. Normal ileal and colonic mucosa, randomly biopsied to rule out microscopic colitis    PLAN:   1. Follow-up biopsies; the patient to contact me in 1 week for results  2. Tentative screening colonoscopy in 10 years, health permitting    INFORMED CONSENT:  Informed consent for colonoscopy was obtained. The benefits and risks including adverse medicine reaction and perforation have been explained. The patient's questions were answered and the patient agreed to proceed. ASA: ASA 2 - Patient with mild systemic disease with no functional limitations     SEDATION: MAC    The patient's vital signs, cardiac status, pulmonary status, abdominal status and mental status were stable for the procedure. The patient's vital signs and respiratory function as monitored by oxygen saturation remained stable. COLON PREPARATION:  The patient was given a split colon preparation and the preparation was adequate. Procedure Details: An anal exam was performed and this shows hemorrhoids. A digital rectal exam was performed and no masses palpated. The Olympus videocolonoscope  was inserted in the rectum and carefully advanced to the cecum as identified by IC valve, crow's foot appearance and appendix. The cecum was photodocumented.   The colonoscope was slowly withdrawn and retrograde examination of the colon was carefully performed with inspection around and between folds. The ascending colon and cecum were intubated twice with repeat antegrade and retrograde examination. Retroflexion in the rectum was performed. Cecum Intubated: Yes    Findings: The ileum is normal.  The colonic mucosa is normal.  Random biopsies were taken to rule out microscopic colitis. There are no significant polyps or tumors. As above, and on retroflexion of the scope in the rectum as well as withdrawing the scope through the anal canal, hemorrhoids are noted.     Estimated Blood Loss:  Minimal  Complications: None    Signed By: Jeffrey Mtz MD

## 2022-06-04 NOTE — RESULT ENCOUNTER NOTE
Colonoscopy for colon cancer screening and chronic diarrhea done May 31 showed microscopic, collagenous colitis. I reported the results to the patient and recommended a screening colonoscopy in 10 years. I prescribed Entocort and she will take 9 mg for 3 weeks, then 6 mg for 3 weeks then 3 mg and she is to contact me in 2 weeks to report her response and transition back to sulfasalazine.

## 2022-10-22 ENCOUNTER — HOSPITAL ENCOUNTER (EMERGENCY)
Age: 67
Discharge: HOME | End: 2022-10-22
Payer: MEDICARE

## 2022-10-22 ENCOUNTER — APPOINTMENT (OUTPATIENT)
Dept: CT IMAGING | Age: 67
End: 2022-10-22
Payer: MEDICARE

## 2022-10-22 ENCOUNTER — APPOINTMENT (OUTPATIENT)
Dept: GENERAL RADIOLOGY | Age: 67
End: 2022-10-22
Payer: MEDICARE

## 2022-10-22 VITALS
DIASTOLIC BLOOD PRESSURE: 62 MMHG | HEART RATE: 72 BPM | TEMPERATURE: 97.4 F | OXYGEN SATURATION: 93 % | HEIGHT: 64 IN | SYSTOLIC BLOOD PRESSURE: 127 MMHG | RESPIRATION RATE: 11 BRPM | BODY MASS INDEX: 20.96 KG/M2 | WEIGHT: 122.8 LBS

## 2022-10-22 DIAGNOSIS — T73.2XXA FATIGUE DUE TO EXPOSURE, INITIAL ENCOUNTER: ICD-10-CM

## 2022-10-22 DIAGNOSIS — U07.1 COVID-19: Primary | ICD-10-CM

## 2022-10-22 LAB
A/G RATIO: 2.1 (ref 1.1–2.2)
ALBUMIN SERPL-MCNC: 4.5 G/DL (ref 3.4–5)
ALP BLD-CCNC: 79 U/L (ref 40–129)
ALT SERPL-CCNC: 18 U/L (ref 10–40)
ANION GAP SERPL CALCULATED.3IONS-SCNC: 14 MMOL/L (ref 3–16)
AST SERPL-CCNC: 30 U/L (ref 15–37)
BASOPHILS ABSOLUTE: 0 K/UL (ref 0–0.2)
BASOPHILS RELATIVE PERCENT: 0.4 %
BILIRUB SERPL-MCNC: <0.2 MG/DL (ref 0–1)
BILIRUBIN URINE: NEGATIVE
BLOOD, URINE: NEGATIVE
BUN BLDV-MCNC: 22 MG/DL (ref 7–20)
CALCIUM SERPL-MCNC: 9.3 MG/DL (ref 8.3–10.6)
CHLORIDE BLD-SCNC: 100 MMOL/L (ref 99–110)
CLARITY: CLEAR
CO2: 24 MMOL/L (ref 21–32)
COLOR: ABNORMAL
CREAT SERPL-MCNC: 0.8 MG/DL (ref 0.6–1.2)
EOSINOPHILS ABSOLUTE: 0 K/UL (ref 0–0.6)
EOSINOPHILS RELATIVE PERCENT: 0.4 %
GFR SERPL CREATININE-BSD FRML MDRD: >60 ML/MIN/{1.73_M2}
GLUCOSE BLD-MCNC: 150 MG/DL (ref 70–99)
GLUCOSE URINE: NEGATIVE MG/DL
HCT VFR BLD CALC: 37.9 % (ref 36–48)
HEMOGLOBIN: 12.9 G/DL (ref 12–16)
KETONES, URINE: 15 MG/DL
LACTIC ACID: 1 MMOL/L (ref 0.4–2)
LEUKOCYTE ESTERASE, URINE: NEGATIVE
LIPASE: 39 U/L (ref 13–60)
LYMPHOCYTES ABSOLUTE: 1.5 K/UL (ref 1–5.1)
LYMPHOCYTES RELATIVE PERCENT: 29.6 %
MCH RBC QN AUTO: 32 PG (ref 26–34)
MCHC RBC AUTO-ENTMCNC: 33.9 G/DL (ref 31–36)
MCV RBC AUTO: 94.5 FL (ref 80–100)
MICROSCOPIC EXAMINATION: ABNORMAL
MONOCYTES ABSOLUTE: 0.5 K/UL (ref 0–1.3)
MONOCYTES RELATIVE PERCENT: 10.8 %
NEUTROPHILS ABSOLUTE: 2.9 K/UL (ref 1.7–7.7)
NEUTROPHILS RELATIVE PERCENT: 58.8 %
NITRITE, URINE: NEGATIVE
PDW BLD-RTO: 13.7 % (ref 12.4–15.4)
PH UA: 6.5 (ref 5–8)
PLATELET # BLD: 164 K/UL (ref 135–450)
PMV BLD AUTO: 9.6 FL (ref 5–10.5)
POTASSIUM SERPL-SCNC: 3.5 MMOL/L (ref 3.5–5.1)
PRO-BNP: 47 PG/ML (ref 0–124)
PROTEIN UA: NEGATIVE MG/DL
RBC # BLD: 4.01 M/UL (ref 4–5.2)
SODIUM BLD-SCNC: 138 MMOL/L (ref 136–145)
SPECIFIC GRAVITY UA: 1.05 (ref 1–1.03)
TOTAL PROTEIN: 6.6 G/DL (ref 6.4–8.2)
TROPONIN: <0.01 NG/ML
URINE REFLEX TO CULTURE: ABNORMAL
URINE TYPE: ABNORMAL
UROBILINOGEN, URINE: 0.2 E.U./DL
WBC # BLD: 5 K/UL (ref 4–11)

## 2022-10-22 PROCEDURE — 96374 THER/PROPH/DIAG INJ IV PUSH: CPT

## 2022-10-22 PROCEDURE — 84484 ASSAY OF TROPONIN QUANT: CPT

## 2022-10-22 PROCEDURE — 80053 COMPREHEN METABOLIC PANEL: CPT

## 2022-10-22 PROCEDURE — 96375 TX/PRO/DX INJ NEW DRUG ADDON: CPT

## 2022-10-22 PROCEDURE — 36415 COLL VENOUS BLD VENIPUNCTURE: CPT

## 2022-10-22 PROCEDURE — 2580000003 HC RX 258: Performed by: PHYSICIAN ASSISTANT

## 2022-10-22 PROCEDURE — 6360000002 HC RX W HCPCS: Performed by: PHYSICIAN ASSISTANT

## 2022-10-22 PROCEDURE — 71045 X-RAY EXAM CHEST 1 VIEW: CPT

## 2022-10-22 PROCEDURE — 81003 URINALYSIS AUTO W/O SCOPE: CPT

## 2022-10-22 PROCEDURE — 85025 COMPLETE CBC W/AUTO DIFF WBC: CPT

## 2022-10-22 PROCEDURE — 83880 ASSAY OF NATRIURETIC PEPTIDE: CPT

## 2022-10-22 PROCEDURE — 93005 ELECTROCARDIOGRAM TRACING: CPT | Performed by: PHYSICIAN ASSISTANT

## 2022-10-22 PROCEDURE — 71260 CT THORAX DX C+: CPT | Performed by: PHYSICIAN ASSISTANT

## 2022-10-22 PROCEDURE — 6360000004 HC RX CONTRAST MEDICATION: Performed by: PHYSICIAN ASSISTANT

## 2022-10-22 PROCEDURE — 87040 BLOOD CULTURE FOR BACTERIA: CPT

## 2022-10-22 PROCEDURE — 99285 EMERGENCY DEPT VISIT HI MDM: CPT

## 2022-10-22 PROCEDURE — 83605 ASSAY OF LACTIC ACID: CPT

## 2022-10-22 PROCEDURE — 83690 ASSAY OF LIPASE: CPT

## 2022-10-22 RX ORDER — ONDANSETRON 4 MG/1
4 TABLET, ORALLY DISINTEGRATING ORAL EVERY 8 HOURS PRN
Qty: 10 TABLET | Refills: 0 | Status: SHIPPED | OUTPATIENT
Start: 2022-10-22

## 2022-10-22 RX ORDER — DEXAMETHASONE SODIUM PHOSPHATE 4 MG/ML
6 INJECTION, SOLUTION INTRA-ARTICULAR; INTRALESIONAL; INTRAMUSCULAR; INTRAVENOUS; SOFT TISSUE ONCE
Status: COMPLETED | OUTPATIENT
Start: 2022-10-22 | End: 2022-10-22

## 2022-10-22 RX ORDER — PREDNISONE 20 MG/1
40 TABLET ORAL DAILY
Qty: 8 TABLET | Refills: 0 | Status: SHIPPED | OUTPATIENT
Start: 2022-10-22 | End: 2022-10-26

## 2022-10-22 RX ORDER — KETOROLAC TROMETHAMINE 30 MG/ML
15 INJECTION, SOLUTION INTRAMUSCULAR; INTRAVENOUS ONCE
Status: COMPLETED | OUTPATIENT
Start: 2022-10-22 | End: 2022-10-22

## 2022-10-22 RX ORDER — ALBUTEROL SULFATE 90 UG/1
2 AEROSOL, METERED RESPIRATORY (INHALATION) EVERY 6 HOURS PRN
Qty: 18 G | Refills: 0 | Status: SHIPPED | OUTPATIENT
Start: 2022-10-22

## 2022-10-22 RX ORDER — 0.9 % SODIUM CHLORIDE 0.9 %
1000 INTRAVENOUS SOLUTION INTRAVENOUS ONCE
Status: COMPLETED | OUTPATIENT
Start: 2022-10-22 | End: 2022-10-22

## 2022-10-22 RX ORDER — AZITHROMYCIN 250 MG/1
TABLET, FILM COATED ORAL
Qty: 1 PACKET | Refills: 0 | Status: SHIPPED | OUTPATIENT
Start: 2022-10-22 | End: 2022-11-30 | Stop reason: ALTCHOICE

## 2022-10-22 RX ADMIN — IOPAMIDOL 75 ML: 755 INJECTION, SOLUTION INTRAVENOUS at 20:06

## 2022-10-22 RX ADMIN — DEXAMETHASONE SODIUM PHOSPHATE 6 MG: 4 INJECTION, SOLUTION INTRAMUSCULAR; INTRAVENOUS at 18:24

## 2022-10-22 RX ADMIN — SODIUM CHLORIDE 1000 ML: 9 INJECTION, SOLUTION INTRAVENOUS at 18:23

## 2022-10-22 RX ADMIN — KETOROLAC TROMETHAMINE 15 MG: 30 INJECTION, SOLUTION INTRAMUSCULAR at 18:23

## 2022-10-22 ASSESSMENT — ENCOUNTER SYMPTOMS
NAUSEA: 1
ABDOMINAL PAIN: 0
DIARRHEA: 1
VOMITING: 1
SHORTNESS OF BREATH: 1
COLOR CHANGE: 0
COUGH: 1

## 2022-10-22 ASSESSMENT — PAIN SCALES - GENERAL
PAINLEVEL_OUTOF10: 0

## 2022-10-22 ASSESSMENT — LIFESTYLE VARIABLES
HOW MANY STANDARD DRINKS CONTAINING ALCOHOL DO YOU HAVE ON A TYPICAL DAY: PATIENT DOES NOT DRINK
HOW OFTEN DO YOU HAVE A DRINK CONTAINING ALCOHOL: NEVER

## 2022-10-22 ASSESSMENT — PAIN - FUNCTIONAL ASSESSMENT: PAIN_FUNCTIONAL_ASSESSMENT: 0-10

## 2022-10-22 NOTE — ED NOTES
Pt arrived to dept via EMS. Pt c/o general body weakness with N/V. Pt awake, alert and oriented x 3. Pt is ill-appearing. Resp easy and unlabored. Pt placed in gown and on cardiac monitor. Call light in reach. Will continue to monitor.      Ida Saul, PILLO  10/22/22 8179

## 2022-10-22 NOTE — ED PROVIDER NOTES
[facility]      Pt Name: Wali Herrera  MRN: [id number]  [place] 1955  Date of evaluation: 10/22/2022  Provider: AMMY Arteaga    This patient was not seen and evaluated by the attending physician No att. providers found. CHIEF COMPLAINT       Chief Complaint   Patient presents with    Fatigue     Pt was brought via EMS for c/o general weakness. Pt states she has been feeling week for a week now and was near her daughter recently who just tested + for COV-19. Pt did an at home test today and the results were positive. Pt also c/o, n/v no diarrhea, SOB today, pt is ill-appearing, eyes closed in stretcher. VS WNL. CRITICAL CARE TIME   I performed a total Critical Care time of 15 minutes, excluding separately reportable procedures. There was a high probability of clinically significant/life threatening deterioration in the patient's condition which required my urgent intervention. Not limited to multiple reexaminations, discussions with attending physician and consultants. HISTORY OF PRESENT ILLNESS  (Location/Symptom, Timing/Onset, Context/Setting, Quality, Duration, Modifying Factors, Severity.)   Wali Herrera is a 75 y.o. female who presents to the emergency department accompanied by her . She complains of fatigue, shortness of breath, nausea vomiting and some loose stools. Symptoms of been present for a week.  tells me that they tested positive for COVID today after being exposed to her daughter who was positive. The patient does have a history of IBS and deals with some diarrhea at times but is also decreased appetite this morning she felt more short of breath. She is coughing up some clear and brown phlegm. She is taken 4 aspirin today for the symptoms. Denies a headache or neck stiffness. She does smoke cigarettes. No prior history of pulmonary embolism or DVT.     Nursing Notes were reviewed and I agree. REVIEW OF SYSTEMS    (2-9 systems for level 4, 10 or more for level 5)     Review of Systems   Constitutional:  Positive for chills and fatigue. Respiratory:  Positive for cough and shortness of breath. Cardiovascular:  Negative for chest pain and leg swelling. Gastrointestinal:  Positive for diarrhea, nausea and vomiting. Negative for abdominal pain. Musculoskeletal:  Positive for myalgias. Negative for neck pain and neck stiffness. Skin:  Negative for color change, rash and wound. Neurological:  Positive for weakness. Negative for numbness and headaches. Psychiatric/Behavioral:  Negative for agitation, behavioral problems and confusion. Except as noted above the remainder of the review of systems was reviewed and negative.        PAST MEDICAL HISTORY         Diagnosis Date    Absence of teeth     front tooth flipper    Arthritis     hands    Colitis     microscopic    Wears glasses     reading    Wears partial dentures     lower       SURGICAL HISTORY           Procedure Laterality Date    ANKLE FRACTURE SURGERY Right 12/14/2020    OPEN REDUCTION INTERNAL FIXATION RIGHT ANKLE performed by Maldonado Burnette MD at [facility] Right 4/26/2021    RIGHT ANKLE SYNDESMOSIS SCREW REMOVAL performed by Maldonado Burnette MD at [address] [facility]  11/29/12    right    CARPAL TUNNEL RELEASE  12/20/12    Left    [address] [place] [address]    COLONOSCOPY  9/2006    kindel, colitis    COLONOSCOPY N/A 5/31/2022    COLONOSCOPY WITH BIOPSY performed by Montrell Romeo MD at [address] [place] [address] Bilateral     cataract removal    JOINT REPLACEMENT Right 2010    right hip        CURRENT MEDICATIONS       Discharge Medication List as of 10/22/2022  9:35 PM        CONTINUE these medications which have NOT CHANGED    Details   Aspirin-Acetaminophen-Caffeine (EXCEDRIN PO) Take by mouthHistorical Med      loratadine (CLARITIN) 10 MG capsule Take 10 mg by mouth dailyHistorical Med      fluticasone (FLONASE) 50 MCG/ACT nasal spray 1 spray by Each Nostril route dailyHistorical Med      Multiple Minerals-Vitamins (CITRACAL MAXIMUM PLUS) TABS Take 1 tablet by mouth dailyHistorical Med      NONFORMULARY daily Protanbim--OTC supplement for alzhiemersHistorical Med      Ascorbic Acid (VITAMIN C) 250 MG tablet Take 250 mg by mouth every eveningHistorical Med      Turmeric 500 MG TABS Take 1,000 mg by mouth every eveningHistorical Med      Vitamin A 2400 MCG (8000 UT) TABS Take by mouth every eveningHistorical Med      Cholecalciferol (VITAMIN D3) 125 MCG (5000 UT) TABS Take by mouth every eveningHistorical Med      Omega-3 Fatty Acids (FISH OIL) 1000 MG CPDR Take 3,000 mg by mouth every eveningHistorical Med      Misc Natural Products (AIRBORNE ELDERBERRY) CHEW Take 50 mg by mouth every eveningHistorical Med      Multiple Vitamins-Minerals (THERAPEUTIC MULTIVITAMIN-MINERALS) tablet Take 1 tablet by mouth dailyHistorical Med      Coenzyme Q10 (CO Q 10 PO) Take 100 mg by mouth every evening Historical Med      MAGNESIUM PO Take 500 mg by mouth daily Historical Med             ALLERGIES     Erythromycin    FAMILY HISTORY           Problem Relation Age of Onset    Alzheimer's Disease Mother     Stroke Father     Cancer Other     Diabetes Other     Heart Disease Other     Hypertension Other      Family Status   Relation Name Status    Mother      Father      Other  (Not Specified)    Other  (Not Specified)    Other  (Not Specified)    Other  (Not Specified)        SOCIAL HISTORY      reports that she has been smoking cigarettes. She started smoking about 50 years ago. She has a 46.00 pack-year smoking history. She has never used smokeless tobacco. She reports current alcohol use. She reports current drug use. Drug: Marijuana Burnell Goldberg).     PHYSICAL EXAM    (up to 7 for level 4, 8 or more for level 5)     ED Triage Vitals [10/22/22 1704]   BP Temp Temp Source Heart Rate Resp SpO2 Height Weight   114/64 97.4 °F (36.3 °C) Oral 69 14 95 % 5' 4\" (1.626 m) 122 lb 12.7 oz (55.7 kg)       Physical Exam  Vitals and nursing note reviewed. Constitutional:       General: She is not in acute distress. HENT:      Head: Normocephalic. Mouth/Throat:      Mouth: Mucous membranes are moist.   Eyes:      Pupils: Pupils are equal, round, and reactive to light. Cardiovascular:      Rate and Rhythm: Normal rate. Pulses: Normal pulses. Pulmonary:      Effort: Pulmonary effort is normal. No respiratory distress. Breath sounds: Wheezing present. Musculoskeletal:         General: No swelling. Normal range of motion. Cervical back: Normal range of motion. Skin:     General: Skin is warm. Neurological:      General: No focal deficit present. Mental Status: She is alert and oriented to person, place, and time. Cranial Nerves: No cranial nerve deficit. Sensory: No sensory deficit. Gait: Gait normal.   Psychiatric:         Mood and Affect: Mood normal.       DIAGNOSTIC RESULTS     EKG: All EKG's are interpreted by AMMY Tillman in the absence of a cardiologist.    EKG interpreted by myself - please refer to attending physician's note for complete EKG interpretation:    No evidence of acute ischemia or injury. RADIOLOGY:   Non-plain film images such as CT, Ultrasound and MRI are read by the radiologist. Plain radiographic images are visualized and preliminarily interpreted by AMMY Tillman with the below findings:    Reviewed radiologist's interpretation. Interpretation per the Radiologist below, if available at the time of this note:    CT CHEST PULMONARY EMBOLISM W CONTRAST   Final Result   No evidence of a pulmonary embolus. Mildly dilated and atherosclerotic thoracic aorta with no aneurysm or   dissection and unremarkable mediastinum.       Hazy ground-glass and linear densities along the lung bases which may   represent atelectasis vs early infiltrates or scarring. Suggest follow-up   with serial chest x-rays         XR CHEST PORTABLE   Final Result   1. No active pulmonary disease. LABS:  Labs Reviewed   COMPREHENSIVE METABOLIC PANEL - Abnormal; Notable for the following components:       Result Value    Glucose 150 (*)     BUN 22 (*)     All other components within normal limits   URINALYSIS WITH REFLEX TO CULTURE - Abnormal; Notable for the following components:    Color, UA DARK YELLOW (*)     Ketones, Urine 15 (*)     All other components within normal limits   CULTURE, BLOOD 1   CULTURE, BLOOD 2   CBC WITH AUTO DIFFERENTIAL   LIPASE   TROPONIN   BRAIN NATRIURETIC PEPTIDE   LACTIC ACID       All other labs were within normal range or not returned as of this dictation. EMERGENCY DEPARTMENT COURSE and DIFFERENTIAL DIAGNOSIS/MDM:   Vitals:    Vitals:    10/22/22 2015 10/22/22 2030 10/22/22 2045 10/22/22 2100   BP: (!) 111/54 (!) 113/53 (!) 101/47 127/62   Pulse: 79 77 81 72   Resp: 12 18 11 11   Temp:       TempSrc:       SpO2: 97% 90% 94% 93%   Weight:       Height:         I discussed with Maira BROWN Alonzo and/or family the exam results, diagnosis, care, prognosis, reasons to return and the importance of follow up. Patient and/or family is in full agreement with plan and all questions have been answered. Specific discharge instructions explained, including reasons to return to the emergency department. Mirela Osuna is well appearing, non-toxic, and afebrile at the time of discharge. Patient has been sick for a week. She tested positive for COVID today. Encourage smoking cessation. No pneumonia on imaging however she has findings consistent with COVID on CT scan no blood clot. With smoking history and some scant wheezing on auscultation will cover with an antibiotic, steroids inhalers. Instructed her to follow-up with her primary care doc she is tolerating p.o. Feeling improved. No tachycardia no hypoxia. Not tachypneic. Lab work reassuring including a normal white count, lactic acid and a creatinine of 0.8. Return for new, worsening or other concerns. I estimate there is LOW risk for PULMONARY EMBOLISM, PULMONARY EDEMA, PNEUMONIA, PNEUMOTHORAX, STATUS ASTHMATICUS, ACUTE RESPIRATORY FAILURE, OR ACUTE CORONARY SYNDROME, thus I consider the discharge disposition reasonable. CONSULTS:  None    PROCEDURES:  Procedures      FINAL IMPRESSION      1. COVID-19    2.  Fatigue due to exposure, initial encounter          DISPOSITION/PLAN   DISPOSITION Decision To Discharge 10/22/2022 09:26:04 PM      PATIENT REFERRED TO:  MD Kush Jeronimo [address] [place] [address]  [phone]    Call   For follow up    DISCHARGE MEDICATIONS:  Discharge Medication List as of 10/22/2022  9:35 PM        START taking these medications    Details   predniSONE (DELTASONE) 20 MG tablet Take 2 tablets by mouth daily for 4 days, Disp-8 tablet, R-0Print      azithromycin (ZITHROMAX) 250 MG tablet 2 po day 1, then 1 po days 2-5, Disp-1 packet, R-0Print      albuterol sulfate HFA (PROVENTIL HFA) 108 (90 Base) MCG/ACT inhaler Inhale 2 puffs into the lungs every 6 hours as needed for Wheezing or Shortness of Breath, Disp-18 g, R-0Print      ondansetron (ZOFRAN ODT) 4 MG disintegrating tablet Take 1 tablet by mouth every 8 hours as needed for Nausea or Vomiting Let dissolve in mouth., Disp-10 tablet, R-0Print             (Please note that portions of this note were completed with a voice recognition program.  Efforts were made to edit the dictations but occasionally words are mis-transcribed.)    Efraín Rogers, [address], [address]  10/22/22 1296

## 2022-10-23 LAB
EKG ATRIAL RATE: 78 BPM
EKG DIAGNOSIS: NORMAL
EKG P AXIS: 77 DEGREES
EKG P-R INTERVAL: 162 MS
EKG Q-T INTERVAL: 454 MS
EKG QRS DURATION: 96 MS
EKG QTC CALCULATION (BAZETT): 517 MS
EKG R AXIS: 53 DEGREES
EKG T AXIS: 66 DEGREES
EKG VENTRICULAR RATE: 78 BPM

## 2022-10-23 PROCEDURE — 93010 ELECTROCARDIOGRAM REPORT: CPT | Performed by: INTERNAL MEDICINE

## 2022-10-23 NOTE — ED NOTES
Discharge and education instructions reviewed. Patient verbalized understanding, teach-back successful. Patient denied questions at this time. No acute distress noted. Patient instructed to follow-up as noted - return to emergency department if symptoms worsen. Patient verbalized understanding. Discharged per EDMD with discharge instructions.         Yury Jimenes RN  10/22/22 5956

## 2022-10-26 LAB — BLOOD CULTURE, ROUTINE: NORMAL

## 2022-11-28 SDOH — HEALTH STABILITY: PHYSICAL HEALTH: ON AVERAGE, HOW MANY MINUTES DO YOU ENGAGE IN EXERCISE AT THIS LEVEL?: 0 MIN

## 2022-11-28 SDOH — HEALTH STABILITY: PHYSICAL HEALTH: ON AVERAGE, HOW MANY DAYS PER WEEK DO YOU ENGAGE IN MODERATE TO STRENUOUS EXERCISE (LIKE A BRISK WALK)?: 0 DAYS

## 2022-11-30 ENCOUNTER — OFFICE VISIT (OUTPATIENT)
Dept: ORTHOPEDIC SURGERY | Age: 67
End: 2022-11-30
Payer: MEDICARE

## 2022-11-30 VITALS — HEIGHT: 64 IN | WEIGHT: 120 LBS | BODY MASS INDEX: 20.49 KG/M2

## 2022-11-30 DIAGNOSIS — G89.29 CHRONIC LOW BACK PAIN, UNSPECIFIED BACK PAIN LATERALITY, UNSPECIFIED WHETHER SCIATICA PRESENT: Primary | ICD-10-CM

## 2022-11-30 DIAGNOSIS — M54.50 CHRONIC LOW BACK PAIN, UNSPECIFIED BACK PAIN LATERALITY, UNSPECIFIED WHETHER SCIATICA PRESENT: Primary | ICD-10-CM

## 2022-11-30 DIAGNOSIS — M47.816 LUMBAR SPONDYLOSIS: ICD-10-CM

## 2022-11-30 PROCEDURE — 1123F ACP DISCUSS/DSCN MKR DOCD: CPT | Performed by: PHYSICIAN ASSISTANT

## 2022-11-30 PROCEDURE — 99214 OFFICE O/P EST MOD 30 MIN: CPT | Performed by: PHYSICIAN ASSISTANT

## 2022-11-30 RX ORDER — IBUPROFEN 200 MG
200 TABLET ORAL EVERY 6 HOURS PRN
COMMUNITY

## 2022-12-01 NOTE — PROGRESS NOTES
History of present illness:   Ms. Manoj Bianchi is a pleasant 75 y.o. female kindly referred by self f regarding her low back pain and right greater than left leg pain. Her pain began suddenly associated with a lifting injury approximately 4 months ago. Pain has steadily worsened since onset. Back pain 9/10 VAS, right and left buttock pain 9/10 VAS, right and left leg pain 10/10 VAS. Pain is describes as aching, throbbing. She denies numbness and tingling lower extremities. She reports perceived weakness of her right and left leg. She denies bowel or bladder dysfunction and saddle anesthesia. She can sit for a maximum of unlimited minutes and stand for a maximum 5-10 minutes. Pain does disrupt her sleep. Prior medications and treatment tried include Excedrin and ibuprofen without relief. Past medical history:  Her past medical history has been reviewed. Past Medical History:   Diagnosis Date    Absence of teeth     front tooth flipper    Arthritis     hands    Carpal tunnel syndrome     Colitis     microscopic    Fractures     Osteoarthritis     Wears glasses     reading    Wears partial dentures     lower       Her past surgical history has been reviewed.   Past Surgical History:   Procedure Laterality Date    ANKLE FRACTURE SURGERY Right 2020    OPEN REDUCTION INTERNAL FIXATION RIGHT ANKLE performed by Marek Hoffman MD at [place] Right 2021    RIGHT ANKLE SYNDESMOSIS SCREW REMOVAL performed by Marek Hoffman MD at [address] [place] [address],[address]  12    right    CARPAL TUNNEL RELEASE  12    Left     SECTION      COLONOSCOPY  2006    kindel, colitis    COLONOSCOPY N/A 2022    COLONOSCOPY WITH BIOPSY performed by Nik Noonan MD at [address] [place] [address] Bilateral     cataract removal    JOINT REPLACEMENT Right     right hip          Her medications and allergies were reviewed. Current Outpatient Medications   Medication Sig Dispense Refill    ibuprofen (ADVIL;MOTRIN) 200 MG tablet Take 200 mg by mouth every 6 hours as needed for Pain      albuterol sulfate HFA (PROVENTIL HFA) 108 (90 Base) MCG/ACT inhaler Inhale 2 puffs into the lungs every 6 hours as needed for Wheezing or Shortness of Breath 18 g 0    ondansetron (ZOFRAN ODT) 4 MG disintegrating tablet Take 1 tablet by mouth every 8 hours as needed for Nausea or Vomiting Let dissolve in mouth. 10 tablet 0    Aspirin-Acetaminophen-Caffeine (EXCEDRIN PO) Take by mouth      loratadine (CLARITIN) 10 MG capsule Take 10 mg by mouth daily      fluticasone (FLONASE) 50 MCG/ACT nasal spray 1 spray by Each Nostril route daily      Multiple Minerals-Vitamins (CITRACAL MAXIMUM PLUS) TABS Take 1 tablet by mouth daily      NONFORMULARY daily Protanbim--OTC supplement for alzhiemers      Ascorbic Acid (VITAMIN C) 250 MG tablet Take 250 mg by mouth every evening      Turmeric 500 MG TABS Take 1,000 mg by mouth every evening      Vitamin A 2400 MCG (8000 UT) TABS Take by mouth every evening      Cholecalciferol (VITAMIN D3) 125 MCG (5000 UT) TABS Take by mouth every evening      Omega-3 Fatty Acids (FISH OIL) 1000 MG CPDR Take 3,000 mg by mouth every evening      Misc Natural Products (AIRBORNE ELDERBERRY) CHEW Take 50 mg by mouth every evening      Multiple Vitamins-Minerals (THERAPEUTIC MULTIVITAMIN-MINERALS) tablet Take 1 tablet by mouth daily      Coenzyme Q10 (CO Q 10 PO) Take 100 mg by mouth every evening       MAGNESIUM PO Take 500 mg by mouth daily        No current facility-administered medications for this visit. Her social history has been reviewed.   Social History     Occupational History    Occupation:    Tobacco Use    Smoking status: Every Day     Packs/day: 1.00     Years: 46.00     Pack years: 46.00     Types: Cigarettes     Start date: 7/31/1972    Smokeless tobacco: Never   Vaping Use    Vaping Use: Never used   Substance and Sexual Activity    Alcohol use: Not Currently     Comment: seldom    Drug use: Yes     Types: Marijuana Jose Alfredo Palencia)     Comment: 3-4 x a month    Sexual activity: Yes     Partners: Male         Her family history has been reviewed. Family History   Problem Relation Age of Onset    Alzheimer's Disease Mother     Stroke Father     Cancer Other     Diabetes Other     Heart Disease Other     Hypertension Other          Review of Systems:  I have reviewed the clinically relevant past medical history, medications, allergies, family history, social history, and 13 point Review of Systems from the patient's recent history form & documented any details relevant to today's presenting complaints in the history above. The patient's self-reported past medical history, medications, allergies, family history, social history, and Review of Systems and spine forms from today's date have been scanned into the chart under the \"Media\" tab. Review of symptoms was reviewed and is significant for back pain and negative for recent weight loss, fatigue, chills, visual disturbances, blood in stool or urine, recent infection. Physical examination:  Ms. Ashli Amos most recent vitals:  Vitals  Height: 5' 4\" (162.6 cm)  Weight: 120 lb (54.4 kg)  Body mass index is 20.6 kg/m². General exam:  She is well-developed and well-nourished, is in obvious discomfort and alert and oriented to person, place, and time. She demonstrates appropriate mood and affect. Her skin is warm and dry. Her gait is normal and she walks heel to toe without significant limp or instability. Back:  She stands with slight lumbar flexion. Her lumbar flexion, extension and lateral bending are mildly reduced with pain. She has mild tenderness over her lumbar spine without obvious muscle spasm. The skin over her lumbar spine is normal without a surgical scar.      Lower extremities:  She has 5/5 motor strength of bilateral lower extremities. She has a negative straight leg raise, bilaterally. Deep tendon reflexes: Absent at the patella and Achilles symmetrically  Sensation is intact to light touch L3 to S1 bilaterally. She has no clonus. Hip range of motion is normal, pain-free  Stinchfield test is negative. Imaging:  X rays AP and lateral lumbar spine obtained in the office today. X-rays show advanced degenerative disc disease L2-3, L3-4. Questionable L2 vertebral compression fracture. Stable right hip arthroplasty and mild left hip arthritis. Diagnosis:      ICD-10-CM    1. Chronic low back pain, unspecified back pain laterality, unspecified whether sciatica present  M54.50 XR LUMBAR SPINE (2-3 VIEWS)    G89.29       2. Lumbar spondylosis  M47.816 MRI LUMBAR SPINE WO CONTRAST           Assessment/ Plan:    59-year-old female with significant low back and lumbar radicular complaints. X-rays show advanced degenerative disc disease and facet arthritis. Possible L2 vertebral compression deformity with age indeterminate. I had an extensive discussion with Ms. Allison Rushing and/or family regarding the natural history, etiology, and long term consequences of her condition. I have presented reasonable alternatives to the patient's proposed care, treatment, and services. Risks and benefits of the treatment options also reviewed in detail. I have outlined a treatment plan with them. She has had full opportunity to ask her questions. I have answered them all to her satisfaction. I feel that Ms. Allison Rushing understands our discussion today. New Medications prescribed today:  OTC NSAIDS discussed. The most common side effects from NSAIDs are stomachaches, heartburn, and nausea. NSAIDs may irritate the stomach lining. If the medicine upsets your stomach, you can try taking it with food.  But if that doesn't help, talk with your doctor to make sure it's not a more serious problem, such as a stomach ulcer or bleeding in the stomach or intestines. Using NSAIDs may:  Lead to high blood pressure. Make symptoms of heart failure worse. Raise the risk of heart attack, stroke, kidney damage, and skin reactions. Your risks are greater if you take NSAIDs at higher doses or for longer than the label says. People who are older than 72 or who have heart, stomach, or intestinal disease have a higher risk for problems. Further Imaging:  MRI lumbar spine will be obtained. Follow up: After MRI to review test results. She was instructed to call us emergently if she begins to experience bowel or bladder dysfunction, saddle anesthesia, increasing muscle weakness, or onset/ worsening leg symptoms. The total time spent on today's visit including reviewing test results, history, performance of physical exam, counseling/ education, ordering of medications, tests or procedures was 35 minutes. This time does include completion of the medical record. This time excludes any time spent performing procedures or tests in the office. Darrian Rodriguez PA-C   Senior Physician Assistant   [facility]                                         Disclaimer: This note was generated with use of a verbal recognition program (DRAGON) and an attempt was made to check for errors. It is possible that there are still dictated errors within this office note. If so, please bring any significant errors to my attention for an addendum. All efforts were made to ensure that this office note is accurate.

## 2022-12-12 ENCOUNTER — OFFICE VISIT (OUTPATIENT)
Dept: ORTHOPEDIC SURGERY | Age: 67
End: 2022-12-12
Payer: MEDICARE

## 2022-12-12 VITALS — WEIGHT: 120 LBS | BODY MASS INDEX: 20.49 KG/M2 | RESPIRATION RATE: 16 BRPM | HEIGHT: 64 IN

## 2022-12-12 DIAGNOSIS — M48.062 SPINAL STENOSIS OF LUMBAR REGION WITH NEUROGENIC CLAUDICATION: Primary | ICD-10-CM

## 2022-12-12 PROCEDURE — 1123F ACP DISCUSS/DSCN MKR DOCD: CPT | Performed by: PHYSICIAN ASSISTANT

## 2022-12-12 PROCEDURE — 99213 OFFICE O/P EST LOW 20 MIN: CPT | Performed by: PHYSICIAN ASSISTANT

## 2022-12-12 NOTE — PROGRESS NOTES
Subjective:      Patient ID: Izabella Weller is a 75 y.o. female who is here for follow up evaluation of her low back pain and right greater than left leg pain. Her pain began suddenly associated with a lifting injury approximately 4 months ago. Pain has steadily worsened since onset. Back pain 9/10 VAS, right and left buttock pain 9/10 VAS, right and left leg pain 10/10 VAS. Pain is describes as aching, throbbing. She denies numbness and tingling lower extremities. She reports perceived weakness of her right and left leg. She denies bowel or bladder dysfunction and saddle anesthesia. She can sit for a maximum of unlimited minutes and stand for a maximum 5-10 minutes. Pain does disrupt her sleep. Prior medications and treatment tried include Excedrin and ibuprofen without relief. Review of Systems:   Review of symptoms was reviewed and is significant for back pain and negative for recent weight loss, fatigue, chills, visual disturbances, blood in stool or urine, recent infection.       Past Medical History:   Diagnosis Date    Absence of teeth     front tooth flipper    Arthritis     hands    Carpal tunnel syndrome     Colitis     microscopic    Fractures     Osteoarthritis     Wears glasses     reading    Wears partial dentures     lower       Family History   Problem Relation Age of Onset    Alzheimer's Disease Mother     Stroke Father     Cancer Other     Diabetes Other     Heart Disease Other     Hypertension Other        Past Surgical History:   Procedure Laterality Date    ANKLE FRACTURE SURGERY Right 12/14/2020    OPEN REDUCTION INTERNAL FIXATION RIGHT ANKLE performed by Sunil Nguyen MD at [facility] Right 4/26/2021    RIGHT ANKLE SYNDESMOSIS SCREW REMOVAL performed by Sunil Nguyen MD at [address] [facility],[address]  11/29/12    right    CARPAL TUNNEL RELEASE  12/20/12    Left    [facility]    COLONOSCOPY  9/2006 piter, colitis    COLONOSCOPY N/A 5/31/2022    COLONOSCOPY WITH BIOPSY performed by Ashtyn Delong MD at [address] [place] [address] Bilateral     cataract removal    JOINT REPLACEMENT Right 2010    right hip        Social History     Occupational History    Occupation:    Tobacco Use    Smoking status: Every Day     Packs/day: 1.00     Years: 46.00     Pack years: 46.00     Types: Cigarettes     Start date: 7/31/1972    Smokeless tobacco: Never   Vaping Use    Vaping Use: Never used   Substance and Sexual Activity    Alcohol use: Not Currently     Comment: seldom    Drug use: Yes     Types: Marijuana Hammad Goldberg)     Comment: 3-4 x a month    Sexual activity: Yes     Partners: Male       Current Outpatient Medications   Medication Sig Dispense Refill    ibuprofen (ADVIL;MOTRIN) 200 MG tablet Take 200 mg by mouth every 6 hours as needed for Pain      albuterol sulfate HFA (PROVENTIL HFA) 108 (90 Base) MCG/ACT inhaler Inhale 2 puffs into the lungs every 6 hours as needed for Wheezing or Shortness of Breath 18 g 0    ondansetron (ZOFRAN ODT) 4 MG disintegrating tablet Take 1 tablet by mouth every 8 hours as needed for Nausea or Vomiting Let dissolve in mouth.  10 tablet 0    Aspirin-Acetaminophen-Caffeine (EXCEDRIN PO) Take by mouth      loratadine (CLARITIN) 10 MG capsule Take 10 mg by mouth daily      fluticasone (FLONASE) 50 MCG/ACT nasal spray 1 spray by Each Nostril route daily      Multiple Minerals-Vitamins (CITRACAL MAXIMUM PLUS) TABS Take 1 tablet by mouth daily      NONFORMULARY daily Protanbim--OTC supplement for alzhiemers      Ascorbic Acid (VITAMIN C) 250 MG tablet Take 250 mg by mouth every evening      Turmeric 500 MG TABS Take 1,000 mg by mouth every evening      Vitamin A 2400 MCG (8000 UT) TABS Take by mouth every evening      Cholecalciferol (VITAMIN D3) 125 MCG (5000 UT) TABS Take by mouth every evening      Omega-3 Fatty Acids (FISH OIL) 1000 MG CPDR Take 3,000 mg by mouth every evening      [facility] Natural Products (AIRBORNE ELDERBERRY) CHEW Take 50 mg by mouth every evening      Multiple Vitamins-Minerals (THERAPEUTIC MULTIVITAMIN-MINERALS) tablet Take 1 tablet by mouth daily      Coenzyme Q10 (CO Q 10 PO) Take 100 mg by mouth every evening       MAGNESIUM PO Take 500 mg by mouth daily        No current facility-administered medications for this visit. Objective:     Resp 16   Ht 5' 4\" (1.626 m)   Wt 120 lb (54.4 kg)   BMI 20.60 kg/m²      General exam:  She is well-developed and well-nourished, is in obvious discomfort and alert and oriented to person, place, and time. She demonstrates appropriate mood and affect. Her skin is warm and dry. Her gait is normal and she walks heel to toe without significant limp or instability. Back:  She stands with slight lumbar flexion. MRI: Obtained from [facility] or an outside facility.  : [facility] #: [id number] #: [id number] Procedure: MR Lumbar Spine w/o Contrast ; Reason for Exam: lumbar spondylosis   This document is confidential medical information. Unauthorized disclosure or use of this information is prohibited by law. If you are not the intended recipient of this document, please advise us by calling immediately [phone]. [facility] - [address]           Patient Name: Alessandro Burden   Case ID: [id number]   Patient : 1955   Referring Physician: AMMY Vallejo   Exam Date: 2022   Exam Description: MR Lumbar Spine w/o Contrast            HISTORY:  Worsening low back pain 4 months. Bilateral leg pain, numbness, tingling, weakness. TECHNICAL FACTORS:  Long- and short-axis fat- and water-weighted images were performed. COMPARISON:  None. FINDINGS:  Assume five lumbar vertebrae. Short-segment mild lateral curve convex left of    lumbar spine. Lumbar lordosis is moderate.   Conus medullaris termination is normal.  Low thoracic and lumbar discs are dehydrated. Multilevel moderate to large anterior mixed    spondylotic disc displacements. L1-2: Shallow disc bulge encroaches upon ventral dural sac; near to right L2 nerve root. Moderate facet hypertrophy and fluid. Moderate spinal stenosis. Mild right lateral recess    stenosis. L2-3: Moderate disc height reduction; vacuum phenomenon; endplate osteochondrosis; 3 mm    retrolisthesis; shallow disc bulge encroaches upon ventral dural sac, impinges upon L3 nerve    roots; contacts, impinges upon foraminal left L2 nerve root; contacts foraminal right L2 nerve    root. Marked facet hypertrophy; moderate arthropathy; ligamenta flava hypertrophy at 8 mm    thickness on left. Moderate spinal stenosis. Moderate right lateral recess stenosis. Moderate left foraminal stenosis. Mild right foraminal stenosis. L3-4: Moderate disc height reduction, vacuum phenomenon, endplate osteochondrosis; 3 mm    retrolisthesis; listhesed disc encroaches upon ventral dural sac and L4 nerve roots. Moderate    facet hypertrophy. Moderate spinal stenosis. Moderate bilateral lateral recess stenosis. Mild biforaminal stenosis; disc contacts foraminal L3 nerve roots. Low-grade Modic 2, endplate    fatty metaplasia, sterile reactive change; with some accompanying Modic 1 endplate osteoedema. L4-5: Disc bulge, asymmetric to right and moderate right foraminal protrusion; disc flattens    right ventral dural sac, impinges upon right L5 nerve root; contacts left L5 nerve root;    impinges upon foraminal L4 nerve roots. Marked facet hypertrophy; moderate facet fluid;    moderate facet hypertrophy at 8 mm thickness. High-grade spinal stenosis. High-grade right,    moderate left lateral recess stenosis. High-grade right, moderate left foraminal stenosis. L5-S1: Shallow central disc protrusion contacts ventral dural sac. Moderate facet hypertrophy.      Mild spinal stenosis. L5 right pedicle/pars shows slight hyperintense STIR signal.           CONCLUSION:   1. L4-5 high-grade spinal stenosis. High-grade right, moderate left lateral recess stenosis. Disc bulge asymmetric to right and moderate right foraminal protrusion gently flatten right    ventral dural sac, impinge upon right L5 nerve root; impinge upon foraminal L4 nerve roots. 2. L1-2 moderate spinal stenosis. Shallow disc bulge. Posterior element hypertrophy and    fluid. 3. L2-3 moderate spinal stenosis. Shallow disc bulge and trace retrolisthesis; disc impinges    upon L3 and foraminal left L2 nerve root. Posterior element including ligamenta flava    hypertrophy. Moderate right lateral recess stenosis. Moderate left foraminal stenosis. 4. L3-4 moderate spinal stenosis. Trace retrolisthesis; listhesed disc encroaches upon L4    nerve roots. Posterior element hypertrophy. 5. L5 right pedicle/pars osseous stress; without fracture. 6. L5-S1 mild spinal stenosis. Shallow central disc protrusion per posterior element    hypertrophy. Thank you for the opportunity to provide your interpretation. Deepa Peterson MD       A: JOSE MARTIN/aixa 12/08/2022 4:03 PM     X Rays: not performed in the office today:       Diagnosis:       ICD-10-CM    1. Spinal stenosis of lumbar region with neurogenic claudication  M48.062 Joycelyn Smith MD, Pain Management, [facility]           Assessment and Plan:       Assessment:  Various degrees of lumbar canal and foraminal stenosis. I had an extensive discussion with Ms. Loyda Orlando regarding the natural history, etiology, and long term consequences of her condition. I have presented reasonable alternatives to the patient's proposed care, treatment, and services. Risks and benefits of the treatment options also reviewed in detail. I have outlined a treatment plan with them. She has had full opportunity to ask her questions.   I have answered them all to her satisfaction. I feel that Ms. Nancy Rodriguez understands our discussion today. Plan:    Procedures-   At this time, I do not believe spinal surgery is indicated. She may benefit other therapeutic options such as epidural steroid injection, facet injection or other interventional procedures. For this reason, I am going to refer to Dr. Caren Mock for Interventional Pain Management for an evaluation and treatment. Follow up-    Call or return to clinic if these symptoms worsen or fail to improve as anticipated. Chi iSnha PA-C   Senior Physician Assistant   [facility]                                         Disclaimer: This note was generated with use of a verbal recognition program (DRAGON) and an attempt was made to check for errors. It is possible that there are still dictated errors within this office note. If so, please bring any significant errors to my attention for an addendum. All efforts were made to ensure that this office note is accurate.

## 2022-12-20 ENCOUNTER — PATIENT MESSAGE (OUTPATIENT)
Dept: ORTHOPEDIC SURGERY | Age: 67
End: 2022-12-20

## 2022-12-20 DIAGNOSIS — M48.062 SPINAL STENOSIS OF LUMBAR REGION WITH NEUROGENIC CLAUDICATION: ICD-10-CM

## 2022-12-20 DIAGNOSIS — M47.816 LUMBAR SPONDYLOSIS: Primary | ICD-10-CM

## 2022-12-20 RX ORDER — HYDROCODONE BITARTRATE AND ACETAMINOPHEN 5; 325 MG/1; MG/1
1 TABLET ORAL EVERY 6 HOURS PRN
Qty: 28 TABLET | Refills: 0 | Status: SHIPPED | OUTPATIENT
Start: 2022-12-20 | End: 2022-12-27

## 2022-12-20 NOTE — TELEPHONE ENCOUNTER
Controlled substances monitoring: possible medication side effects, risk of tolerance and/or dependence, and alternative treatments discussed, no signs of potential drug abuse or diversion identified, and OARRS report reviewed today- activity consistent with treatment plan.      Rx for Christin Gina

## 2022-12-20 NOTE — TELEPHONE ENCOUNTER
From: Jaymie Vázquez  To: Jovani Cool  Sent: 12/20/2022 11:30 AM EST  Subject: Pain    For Jovani Cool `I was wondering if there was a pain reliver besides IBUPROFEN that I can take for pain in my back and legs, as I got sick from taking it, and stopped. It wasn't working for 8 hour time spans and I had a loss of appetite and felt nauseous and extremely dizzy. I have an appointment on January 16 with the pain management Dr but was hoping there is something else I could take until then.  Thank you

## 2022-12-31 ENCOUNTER — PATIENT MESSAGE (OUTPATIENT)
Dept: ORTHOPEDIC SURGERY | Age: 67
End: 2022-12-31

## 2022-12-31 DIAGNOSIS — M48.062 SPINAL STENOSIS OF LUMBAR REGION WITH NEUROGENIC CLAUDICATION: ICD-10-CM

## 2022-12-31 DIAGNOSIS — M47.816 LUMBAR SPONDYLOSIS: ICD-10-CM

## 2023-01-03 RX ORDER — HYDROCODONE BITARTRATE AND ACETAMINOPHEN 5; 325 MG/1; MG/1
1 TABLET ORAL EVERY 6 HOURS PRN
Qty: 28 TABLET | Refills: 0 | Status: SHIPPED | OUTPATIENT
Start: 2023-01-03 | End: 2023-01-10

## 2023-01-03 NOTE — TELEPHONE ENCOUNTER
From: Tk Delatorre  To: Praveen Max  Sent: 12/31/2022 8:48 PM EST  Subject: Pain    Hello Dr. Edward Reed,  The prescription that was filled for my back pain was for 28 pills. Even though I have been trying to space them out I will be running out before my upcoming appointment at [facility] on the 16 of January. If possible could you call in another prescription to help me make it to the 16th. I would be so grateful for helping me to tolerate this until my appointment.    Sincerely,  Jorge Nguyen

## 2023-02-02 NOTE — PROGRESS NOTES
[facility]  PRE-PROCEDURE INSTRUCTIONS    Procedure date_02/03/2023________Arrival time___0845_________        Procedure time___0945_________       Screening questions for Pain procedures:  Do you have a current infection? ___N______  Are you currently taking an antibiotic?__N____  Are you taking a blood thinner?__N______    It is not necessary to stop eating or drinking prior to this procedure. We would like you to take your medications for blood pressure as usual.  You may be asked to stop blood thinners such as Coumadin, Plavix, Fragmin, Lovenox, etc., or any anti-inflammatories such as:  Aspirin, Ibuprofen, Advil, Naproxen prior to your procedure. We also ask that you stop any OTC medications that cause additional bleeding    You must make arrangements for a responsible adult to arrive with you and stay in our waiting area during your procedure. They will also need to take you home after your procedure. For your safety you will not be allowed to leave alone or drive yourself home. Also for your safety, it is strongly suggested that someone stay with you the first 24 hours after your procedure. For your comfort, please wear simple loose fitting clothing to the center. Please do not bring valuables. If you have a living will and a durable power of  for healthcare, please bring in a copy.     You will need to bring a photo ID and insurance card    Our goal is to provide you with excellent care so if you have any questions, please contact us at the [facility] at [phone]

## 2023-02-03 ENCOUNTER — APPOINTMENT (OUTPATIENT)
Dept: INTERVENTIONAL RADIOLOGY/VASCULAR | Age: 68
End: 2023-02-03
Attending: ANESTHESIOLOGY
Payer: MEDICARE

## 2023-02-03 ENCOUNTER — HOSPITAL ENCOUNTER (OUTPATIENT)
Age: 68
Setting detail: OUTPATIENT SURGERY
Discharge: HOME | End: 2023-02-03
Attending: ANESTHESIOLOGY | Admitting: ANESTHESIOLOGY
Payer: MEDICARE

## 2023-02-03 VITALS
OXYGEN SATURATION: 99 % | WEIGHT: 120 LBS | SYSTOLIC BLOOD PRESSURE: 104 MMHG | HEART RATE: 73 BPM | BODY MASS INDEX: 20.49 KG/M2 | RESPIRATION RATE: 18 BRPM | HEIGHT: 64 IN | TEMPERATURE: 97.8 F | DIASTOLIC BLOOD PRESSURE: 78 MMHG

## 2023-02-03 PROCEDURE — 6360000002 HC RX W HCPCS: Performed by: ANESTHESIOLOGY

## 2023-02-03 PROCEDURE — 3610000054 HC PAIN LEVEL 3 BASE (NON-OR): Performed by: ANESTHESIOLOGY

## 2023-02-03 PROCEDURE — 6360000004 HC RX CONTRAST MEDICATION: Performed by: ANESTHESIOLOGY

## 2023-02-03 PROCEDURE — 2709999900 HC NON-CHARGEABLE SUPPLY: Performed by: ANESTHESIOLOGY

## 2023-02-03 RX ORDER — METHYLPREDNISOLONE ACETATE 80 MG/ML
INJECTION, SUSPENSION INTRA-ARTICULAR; INTRALESIONAL; INTRAMUSCULAR; SOFT TISSUE
Status: COMPLETED | OUTPATIENT
Start: 2023-02-03 | End: 2023-02-03

## 2023-02-03 ASSESSMENT — PAIN - FUNCTIONAL ASSESSMENT: PAIN_FUNCTIONAL_ASSESSMENT: 0-10

## 2023-02-03 ASSESSMENT — PAIN SCALES - GENERAL
PAINLEVEL_OUTOF10: 0
PAINLEVEL_OUTOF10: 0

## 2023-02-03 NOTE — H&P
Patient:  Tisha Stephen  YOB: 1955  Medical Record #:  [id number]   Place: [address] [facility]  Date:  2/3/2023   Physician:  Erinn Sanchez MD    History Obtained From: electronic medical record    HISTORY OF PRESENT ILLNESS    Past Medical History:        Diagnosis Date    Absence of teeth     front tooth flipper    Arthritis     hands    Carpal tunnel syndrome     Colitis     microscopic    Fractures     Osteoarthritis     Wears glasses     reading    Wears partial dentures     lower     Past Surgical History:        Procedure Laterality Date    ANKLE FRACTURE SURGERY Right 12/14/2020    OPEN REDUCTION INTERNAL FIXATION RIGHT ANKLE performed by Carol Zambrano MD at [facility] Right 4/26/2021    RIGHT ANKLE SYNDESMOSIS SCREW REMOVAL performed by Carol Zambrano MD at [address] [place] [address],[address]  11/29/12    right    CARPAL TUNNEL RELEASE  12/20/12    Left    [address] [place] [address]    COLONOSCOPY  9/2006    piter, colitis    COLONOSCOPY N/A 5/31/2022    COLONOSCOPY WITH BIOPSY performed by Vangie Arevalo MD at [address] [place] [address] Bilateral     cataract removal    JOINT REPLACEMENT Right 2010    right hip      Medications Prior to Admission:   No current facility-administered medications on file prior to encounter. Current Outpatient Medications on File Prior to Encounter   Medication Sig Dispense Refill    ibuprofen (ADVIL;MOTRIN) 200 MG tablet Take 200 mg by mouth every 6 hours as needed for Pain      albuterol sulfate HFA (PROVENTIL HFA) 108 (90 Base) MCG/ACT inhaler Inhale 2 puffs into the lungs every 6 hours as needed for Wheezing or Shortness of Breath 18 g 0    ondansetron (ZOFRAN ODT) 4 MG disintegrating tablet Take 1 tablet by mouth every 8 hours as needed for Nausea or Vomiting Let dissolve in mouth.  10 tablet 0    Aspirin-Acetaminophen-Caffeine (EXCEDRIN PO) Take by mouth      loratadine (CLARITIN) 10 MG capsule Take 10 mg by mouth daily      fluticasone (FLONASE) 50 MCG/ACT nasal spray 1 spray by Each Nostril route daily      Multiple Minerals-Vitamins (CITRACAL MAXIMUM PLUS) TABS Take 1 tablet by mouth daily      NONFORMULARY daily Protanbim--OTC supplement for alzhiemers      Ascorbic Acid (VITAMIN C) 250 MG tablet Take 250 mg by mouth every evening      Turmeric 500 MG TABS Take 1,000 mg by mouth every evening      Vitamin A 2400 MCG (8000 UT) TABS Take by mouth every evening      Cholecalciferol (VITAMIN D3) 125 MCG (5000 UT) TABS Take by mouth every evening      Omega-3 Fatty Acids (FISH OIL) 1000 MG CPDR Take 3,000 mg by mouth every evening      Misc Natural Products (AIRBORNE ELDERBERRY) CHEW Take 50 mg by mouth every evening      Multiple Vitamins-Minerals (THERAPEUTIC MULTIVITAMIN-MINERALS) tablet Take 1 tablet by mouth daily      Coenzyme Q10 (CO Q 10 PO) Take 100 mg by mouth every evening       MAGNESIUM PO Take 500 mg by mouth daily        Allergies:  Erythromycin  Social History     Socioeconomic History    Marital status:      Spouse name: Not on file    Number of children: Not on file    Years of education: Not on file    Highest education level: Not on file   Occupational History    Occupation:    Tobacco Use    Smoking status: Every Day     Packs/day: 1.00     Years: 46.00     Pack years: 46.00     Types: Cigarettes     Start date: 7/31/1972    Smokeless tobacco: Never   Vaping Use    Vaping Use: Never used   Substance and Sexual Activity    Alcohol use: Not Currently     Comment: seldom    Drug use: Yes     Types: Marijuana Jupiter Spina)     Comment: 3-4 x a month    Sexual activity: Yes     Partners: Male   Other Topics Concern    Not on file   Social History Narrative    Not on file     Social Determinants of Health     Financial Resource Strain: Not on file   Food Insecurity: Not on file   Transportation Needs: Not on file   Physical Activity: Inactive    Days of Exercise per Week: 0 days    Minutes of Exercise per Session: 0 min   Stress: Not on file   Social Connections: Not on file   Intimate Partner Violence: Not At Risk    Fear of Current or Ex-Partner: No    Emotionally Abused: No    Physically Abused: No    Sexually Abused: No   Housing Stability: Not on file     Family History   Problem Relation Age of Onset    Alzheimer's Disease Mother     Stroke Father     Cancer Other     Diabetes Other     Heart Disease Other     Hypertension Other          PHYSICAL EXAM:      Ht 5' 4\" (1.626 m)   Wt 115 lb (52.2 kg)   BMI 19.74 kg/m²  I            ASSESSMENT AND PLAN:    1. Procedure. options, risks and benefits reviewed with patient and expresses understanding.

## 2023-02-03 NOTE — DISCHARGE INSTRUCTIONS
[facility]   [address] [facility], [address] [address]   [address] [phone]      Patient:  Santi Finn  YOB: 1955  Medical Record #:  [id number]   Date:  2/3/2023   Physician:  Caren Mock MD    Procedure Performed: Lumbar Steroid Injection     Discharge Instructions    Notify Pain Management Services if any of the following occur:    Redness/Swelling at the injection site lasting longer than 2 days  Fever (with redness, swelling, or drainage at the injection site)  Drainage at the injection site  New weakness/ numbness  Severe headache   Loss of bowel/ bladder function    General Instructions: You may experience numbness for several hours following your treatment. You should be cautious using those areas which are numb. Once the numbness wears off, you may apply ice or heat to injection site, if needed. Do not return to work or drive today    Rest today and return to normal activities tomorrow. On average, the steroid takes about 1 week to work and can be up to 2 weeks    You should continue to depend on your primary physician for your medical management of conditions not related to your pain management treatment. Continue to take all your other medications, including blood thinners, as directed by your primary physician unless otherwise instructed. NO changes have been made to your medications. Any changes listed on the discharge are based on patient self reporting. Any EMR warnings regarding drug/drug interactions were dismissed based on current use by the patient, management by prescribing physician and pharmacist and not managed by this physician. Any problem which relates specifically to a treatment or procedure performed today should be directed to the [facility].        [facility]  Division of Interventional Pain Management  [address] [place] [address], [address]  [phone]

## 2023-02-03 NOTE — OP NOTE
Patient:  James Todd  YOB: 1955  Medical Record #:  [id number]   Place: [address] [facility]  Date:  2/3/2023   Physician:  Nancy Valdez MD      PRE-PROCEDURE DIAGNOSIS: M54.16    POST-PROCEDURE DIAGNOSIS: M54.16    PROCEDURE:  Midline interlaminar right  L4-5 epidural steroid injection with fluoroscopy and epidurography. BRIEF HISTORY:  The patient presents today to [facility] for a scheduled lumbar epidural steroid injection procedure. The patient was re-evaluated today and is clinically unchanged as compared to my previous evaluation. The patient is clinically stable to proceed with the procedure. PROCEDURE NOTE:  The procedure was again explained to the patient and the previously distributed pre-procedure literature was reviewed. The options, rationale, and benefits of the procedure including pain relief, functional improvement, and increased mobility, as well as the risks of the procedure including but not limited to infection, bleeding, paresthesia, pain, failure to relieve pain, increased pain, headache, allergic reaction, neurologic impairment, local anesthetic, toxicity, and side effects and the potential side effects of corticosteroids were discussed with the patient and informed written consent was obtained from the patient. The patient was positioned in the prone position on the fluoroscopy table. The skin overlying the lumbosacral vertebrae was prepped using Chloraprep and draped in the usual sterile fashion. The L4-5 lumbar intervertebral level was identified using intermittent AP fluoroscopy. The previously identified projection of overlying skin was anesthetized using 2 cc of buffered 1% lidocaine with a 27 gauge needle. A 3.5\" 22g Touhy needle was advanced through a small skin nick in the AP view towards the interlaminar and epidural space. The epidural space was easily identified using loss of resistance to saline.   No difficulty, paresthesia or occurrence of pain was encountered. Careful aspiration was negative for CSF and blood. A total of 1 cc Isovue 300 was injected yielding an epidurogram.    FLUOROSCOPY:  A fluoroscopy unit was utilized to obtain fluoroscopic images for intra-procedural use and assistance. Fluoroscopy was utilized to identify anatomic and radiographic landmarks for the accompanying procedure guidance and not for diagnostic purposes. After negative aspiration 4 cc of therapeutic injectate containing 1 cc of Depo-Medrol 80 mg/cc and 3 ml of lidocaine 1% was injected slowly in aliquots while clinically observing and monitoring the patient with negative aspiration demonstrated between aliquots of injections. At this time no paresthesia or occurrence of pain was present. The needle was then removed, the area was cleansed and a Band-Aid was placed over the injection site. There were no complications. The patient tolerated the procedure well. The procedure was performed using local anesthesia. The patient was transferred by wheelchair with accompaniment to the  Recovery Area and was monitored per protocol. The vital signs remained stable. The patient was discharged in stable condition accompanied by an escort with written instructions after fulfilling the standard discharge criteria. Written follow up instructions were given to the patient.     Estimated Blood Loss: 0ml    Plan:  Follow up in 6-8 weeks      Office: [phone]

## 2023-02-15 NOTE — PROGRESS NOTES
[facility]  PRE-PROCEDURE INSTRUCTIONS    Procedure date_02/17/2023________Arrival time__0830__________        Procedure time___0930_________       Screening questions for Pain procedures:  Do you have a current infection? ___N______  Are you currently taking an antibiotic?___N___  Are you taking a blood thinner?___N_____    It is not necessary to stop eating or drinking prior to this procedure. We would like you to take your medications for blood pressure as usual.  You may be asked to stop blood thinners such as Coumadin, Plavix, Fragmin, Lovenox, etc., or any anti-inflammatories such as:  Aspirin, Ibuprofen, Advil, Naproxen prior to your procedure. We also ask that you stop any OTC medications that cause additional bleeding    You must make arrangements for a responsible adult to arrive with you and stay in our waiting area during your procedure. They will also need to take you home after your procedure. For your safety you will not be allowed to leave alone or drive yourself home. Also for your safety, it is strongly suggested that someone stay with you the first 24 hours after your procedure. For your comfort, please wear simple loose fitting clothing to the center. Please do not bring valuables. If you have a living will and a durable power of  for healthcare, please bring in a copy.     You will need to bring a photo ID and insurance card    Our goal is to provide you with excellent care so if you have any questions, please contact us at the [facility] at [phone]

## 2023-02-17 ENCOUNTER — APPOINTMENT (OUTPATIENT)
Dept: INTERVENTIONAL RADIOLOGY/VASCULAR | Age: 68
End: 2023-02-17
Attending: ANESTHESIOLOGY
Payer: MEDICARE

## 2023-02-17 ENCOUNTER — HOSPITAL ENCOUNTER (OUTPATIENT)
Age: 68
Setting detail: OUTPATIENT SURGERY
Discharge: HOME | End: 2023-02-17
Attending: ANESTHESIOLOGY | Admitting: ANESTHESIOLOGY
Payer: MEDICARE

## 2023-02-17 VITALS
HEIGHT: 64 IN | OXYGEN SATURATION: 98 % | TEMPERATURE: 97.5 F | DIASTOLIC BLOOD PRESSURE: 81 MMHG | WEIGHT: 119.5 LBS | SYSTOLIC BLOOD PRESSURE: 145 MMHG | HEART RATE: 84 BPM | RESPIRATION RATE: 16 BRPM | BODY MASS INDEX: 20.4 KG/M2

## 2023-02-17 PROCEDURE — 3610000054 HC PAIN LEVEL 3 BASE (NON-OR): Performed by: ANESTHESIOLOGY

## 2023-02-17 PROCEDURE — 2709999900 HC NON-CHARGEABLE SUPPLY: Performed by: ANESTHESIOLOGY

## 2023-02-17 RX ORDER — HYDROCODONE BITARTRATE AND ACETAMINOPHEN 10; 325 MG/1; MG/1
1 TABLET ORAL EVERY 6 HOURS PRN
COMMUNITY

## 2023-02-17 ASSESSMENT — PAIN SCALES - GENERAL
PAINLEVEL_OUTOF10: 0
PAINLEVEL_OUTOF10: 0

## 2023-02-17 ASSESSMENT — PAIN - FUNCTIONAL ASSESSMENT: PAIN_FUNCTIONAL_ASSESSMENT: 0-10

## 2023-02-17 NOTE — OP NOTE
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 BRIEF HISTORY:  The patient presents today to [facility] for a scheduled sciatic nerve block procedure. The patient was re-evaluated today and is clinically unchanged as compared to my previous evaluation. The patient is clinically stable to proceed with the procedure. PROCEDURE NOTE:  The procedure was again explained to the patient and the previously distributed pre-procedure literature was reviewed. The options, rationale, and benefits of the procedure including pain relief, functional improvement, and increased mobility, as well as the risks of the procedure including but not limited to infection, bleeding, paresthesia, pain, failure to relieve pain, increased pain, headache, allergic reaction, neurologic impairment, local anesthetic, toxicity, and side effects and the potential side effects of corticosteroids were discussed with the patient and informed written consent was obtained from the patient. The patient was brought to the fluoroscopy suite and placed in the prone position. The RIGHT sacral and gluteal area was prepped in the usual sterile fashion with Chloraprep and then draped. Intermittent AP fluoroscopy was utilized to localize the radiographic landmarks. A standard perisciatic nerve block approach was used. The sciatic notch, at its superolateral border was localized at its junction with the ilium rostral to the acetabulum and lateral to the SI joint and lateral to the sciatic nerve. It was localized at the radiographic marker of the overlying sciatic nerve between the lateral aspect of the sacrum and the greater trochanter.    The superficial skin projection was anesthetized with buffered lidocaine 1% 2 cc using a 27-gauge needle. A spinal needle was then inserted slowly under direct intermittent AP fluoroscopy towards the ilium and the overlying piriformis muscle. Negative aspiration was re-demonstrated and therapeutic injectate consisting of 6 cc of lidocaine 1%, 1 cc of bupivacaine 0.5% and 1 cc of Depo-Medrol 40 mg/cc was injected without pain, paresthesia, difficulty, or complaints. The needle was removed, the area cleansed, a Band-Aid placed over the injection site. Patient tolerated the procedure well. There were no complications. The patient was transferred, by wheelchair or stretcher, with accompaniment to the recovery area where the vital signs remained stable. There was sensory or motor blockade in the lower extremity. This procedure was done using local anesthesia only. The patient was discharged in stable condition accompanied with an escort after fulfilling standard discharge criteria. FLUOROSCOPY:  A fluoroscopy unit was utilized to obtain fluoroscopic images for intra-procedural use and assistance. Fluoroscopy was utilized to identify anatomic and radiographic landmarks for the accompanying procedure guidance and not for diagnostic purposes.       Estimated Blood Loss: 0ml      Plan:  Follow up in 4-6 weeks      Office: [phone]

## 2023-02-17 NOTE — DISCHARGE INSTRUCTIONS
[facility]   [address] [place], [address] [address]   [address], [address] [place], [place] [phone]      Patient:  Tu Moralez  YOB: 1955  Medical Record #:  [id number]   Date:  2/17/2023   Physician:  Enma Chaney MD    Procedure Performed: [unfilled]     Discharge Instructions    Notify Pain Management Services if any of the following occur:    Redness/Swelling at the injection site lasting longer than 2 days  Fever (with redness, swelling, or drainage at the injection site)  Drainage at the injection site  New weakness/ numbness  Severe headache   Loss of bowel/ bladder function    General Instructions: You may experience numbness for several hours following your treatment. You should be cautious using those areas which are numb. Once the numbness wears off, you may apply ice or heat to injection site, if needed. Do not return to work or drive today    Rest today and return to normal activities tomorrow. On average, the steroid takes about 1 week to work and can be up to 2 weeks    You should continue to depend on your primary physician for your medical management of conditions not related to your pain management treatment. Continue to take all your other medications, including blood thinners, as directed by your primary physician unless otherwise instructed. NO changes have been made to your medications. Any changes listed on the discharge are based on patient self reporting. Any EMR warnings regarding drug/drug interactions were dismissed based on current use by the patient, management by prescribing physician and pharmacist and not managed by this physician. Any problem which relates specifically to a treatment or procedure performed today should be directed to the [facility].        [facility]  [address] [facility], [address] [place] [address], [address]  [phone]

## 2023-02-17 NOTE — H&P
Patient:  Cami Mckeon  YOB: 1955  Medical Record #:  [id number]   Place: [facility] [address]  Date:  2/17/2023   Physician:  Della Saucedo MD    History Obtained From: electronic medical record    HISTORY OF PRESENT ILLNESS    Past Medical History:        Diagnosis Date    Absence of teeth     front tooth flipper    Arthritis     hands    Carpal tunnel syndrome     Colitis     microscopic    Fractures     Osteoarthritis     Wears glasses     reading    Wears partial dentures     lower     Past Surgical History:        Procedure Laterality Date    ANKLE FRACTURE SURGERY Right 12/14/2020    OPEN REDUCTION INTERNAL FIXATION RIGHT ANKLE performed by Skyler Arellano MD at [facility] Right 4/26/2021    RIGHT ANKLE SYNDESMOSIS SCREW REMOVAL performed by Skyler Arellano MD at [address] [place] [address],[address]  11/29/12    right    CARPAL TUNNEL RELEASE  12/20/12    Left    [address]    COLONOSCOPY  9/2006    kindrupal, colitis    COLONOSCOPY N/A 5/31/2022    COLONOSCOPY WITH BIOPSY performed by Gordo Madden MD at [address] [place] [address] Bilateral     cataract removal    JOINT REPLACEMENT Right 2010    right hip     PAIN MANAGEMENT PROCEDURE Right 2/3/2023    LUMBAR EPIDURAL STEROID INJECTION RIGHT L4-5 performed by Stew Jha MD at [address] [address]     Medications Prior to Admission:   No current facility-administered medications on file prior to encounter.      Current Outpatient Medications on File Prior to Encounter   Medication Sig Dispense Refill    ibuprofen (ADVIL;MOTRIN) 200 MG tablet Take 200 mg by mouth every 6 hours as needed for Pain      albuterol sulfate HFA (PROVENTIL HFA) 108 (90 Base) MCG/ACT inhaler Inhale 2 puffs into the lungs every 6 hours as needed for Wheezing or Shortness of Breath 18 g 0    ondansetron (ZOFRAN ODT) 4 MG disintegrating tablet Take 1 tablet by mouth every 8 hours as needed for Nausea or Vomiting Let dissolve in mouth.  10 tablet 0    Aspirin-Acetaminophen-Caffeine (EXCEDRIN PO) Take by mouth      loratadine (CLARITIN) 10 MG capsule Take 10 mg by mouth daily      fluticasone (FLONASE) 50 MCG/ACT nasal spray 1 spray by Each Nostril route daily      Multiple Minerals-Vitamins (CITRACAL MAXIMUM PLUS) TABS Take 1 tablet by mouth daily      NONFORMULARY daily Protanbim--OTC supplement for alzhiemers      Ascorbic Acid (VITAMIN C) 250 MG tablet Take 250 mg by mouth every evening      Turmeric 500 MG TABS Take 1,000 mg by mouth every evening      Vitamin A 2400 MCG (8000 UT) TABS Take by mouth every evening      Cholecalciferol (VITAMIN D3) 125 MCG (5000 UT) TABS Take by mouth every evening      Omega-3 Fatty Acids (FISH OIL) 1000 MG CPDR Take 3,000 mg by mouth every evening      Misc Natural Products (AIRBORNE ELDERBERRY) CHEW Take 50 mg by mouth every evening      Multiple Vitamins-Minerals (THERAPEUTIC MULTIVITAMIN-MINERALS) tablet Take 1 tablet by mouth daily      Coenzyme Q10 (CO Q 10 PO) Take 100 mg by mouth every evening       MAGNESIUM PO Take 500 mg by mouth daily        Allergies:  Erythromycin  Social History     Socioeconomic History    Marital status:      Spouse name: Not on file    Number of children: Not on file    Years of education: Not on file    Highest education level: Not on file   Occupational History    Occupation:    Tobacco Use    Smoking status: Every Day     Packs/day: 1.00     Years: 46.00     Pack years: 46.00     Types: Cigarettes     Start date: 7/31/1972    Smokeless tobacco: Never   Vaping Use    Vaping Use: Never used   Substance and Sexual Activity    Alcohol use: Not Currently     Comment: seldom    Drug use: Yes     Types: Marijuana Candido Martinez)     Comment: 3-4 x a month    Sexual activity: Yes     Partners: Male   Other Topics Concern    Not on file   Social History Narrative    Not on file     Social Determinants of Health Financial Resource Strain: Not on file   Food Insecurity: Not on file   Transportation Needs: Not on file   Physical Activity: Inactive    Days of Exercise per Week: 0 days    Minutes of Exercise per Session: 0 min   Stress: Not on file   Social Connections: Not on file   Intimate Partner Violence: Not At Risk    Fear of Current or Ex-Partner: No    Emotionally Abused: No    Physically Abused: No    Sexually Abused: No   Housing Stability: Not on file     Family History   Problem Relation Age of Onset    Alzheimer's Disease Mother     Stroke Father     Cancer Other     Diabetes Other     Heart Disease Other     Hypertension Other          PHYSICAL EXAM:      Ht 5' 4\" (1.626 m)   Wt 116 lb (52.6 kg)   BMI 19.91 kg/m²  I            ASSESSMENT AND PLAN:    1. Procedure. options, risks and benefits reviewed with patient and expresses understanding.

## 2023-03-01 NOTE — PROGRESS NOTES
[facility] ENDOSCOPY AND OUTPATIENT  PRE-PROCEDURE INSTRUCTIONS    Procedure date_03/03/2023________Arrival time___0900_________        Procedure time_____1000_______       Screening questions for Pain procedures:  Do you have a current infection? ___N______  Are you currently taking an antibiotic?__N____  Are you taking a blood thinner?__N______    It is not necessary to stop eating or drinking prior to this procedure. We would like you to take your medications for blood pressure as usual.  You may be asked to stop blood thinners such as Coumadin, Plavix, Fragmin, Lovenox, etc., or any anti-inflammatories such as:  Aspirin, Ibuprofen, Advil, Naproxen prior to your procedure. We also ask that you stop any OTC medications that cause additional bleeding    You must make arrangements for a responsible adult to arrive with you and stay in our waiting area during your procedure. They will also need to take you home after your procedure. For your safety you will not be allowed to leave alone or drive yourself home. Also for your safety, it is strongly suggested that someone stay with you the first 24 hours after your procedure. For your comfort, please wear simple loose fitting clothing to the center. Please do not bring valuables. If you have a living will and a durable power of  for healthcare, please bring in a copy.     You will need to bring a photo ID and insurance card    Our goal is to provide you with excellent care so if you have any questions, please contact us at the [facility] at [phone]

## 2023-03-03 ENCOUNTER — APPOINTMENT (OUTPATIENT)
Dept: INTERVENTIONAL RADIOLOGY/VASCULAR | Age: 68
End: 2023-03-03
Attending: ANESTHESIOLOGY
Payer: MEDICARE

## 2023-03-03 ENCOUNTER — HOSPITAL ENCOUNTER (OUTPATIENT)
Age: 68
Setting detail: OUTPATIENT SURGERY
Discharge: HOME | End: 2023-03-03
Attending: ANESTHESIOLOGY | Admitting: ANESTHESIOLOGY
Payer: MEDICARE

## 2023-03-03 VITALS
TEMPERATURE: 97.5 F | SYSTOLIC BLOOD PRESSURE: 135 MMHG | WEIGHT: 116 LBS | HEART RATE: 74 BPM | RESPIRATION RATE: 16 BRPM | DIASTOLIC BLOOD PRESSURE: 78 MMHG | HEIGHT: 64 IN | OXYGEN SATURATION: 97 % | BODY MASS INDEX: 19.81 KG/M2

## 2023-03-03 PROCEDURE — 2709999900 HC NON-CHARGEABLE SUPPLY: Performed by: ANESTHESIOLOGY

## 2023-03-03 PROCEDURE — 3610000054 HC PAIN LEVEL 3 BASE (NON-OR): Performed by: ANESTHESIOLOGY

## 2023-03-03 PROCEDURE — 2500000003 HC RX 250 WO HCPCS: Performed by: ANESTHESIOLOGY

## 2023-03-03 PROCEDURE — 6360000002 HC RX W HCPCS: Performed by: ANESTHESIOLOGY

## 2023-03-03 RX ORDER — METHYLPREDNISOLONE ACETATE 80 MG/ML
INJECTION, SUSPENSION INTRA-ARTICULAR; INTRALESIONAL; INTRAMUSCULAR; SOFT TISSUE
Status: COMPLETED | OUTPATIENT
Start: 2023-03-03 | End: 2023-03-03

## 2023-03-03 RX ORDER — BUPIVACAINE HYDROCHLORIDE 5 MG/ML
INJECTION, SOLUTION EPIDURAL; INTRACAUDAL
Status: COMPLETED | OUTPATIENT
Start: 2023-03-03 | End: 2023-03-03

## 2023-03-03 RX ORDER — LIDOCAINE HYDROCHLORIDE 10 MG/ML
INJECTION, SOLUTION EPIDURAL; INFILTRATION; INTRACAUDAL; PERINEURAL
Status: COMPLETED | OUTPATIENT
Start: 2023-03-03 | End: 2023-03-03

## 2023-03-03 ASSESSMENT — PAIN SCALES - GENERAL
PAINLEVEL_OUTOF10: 0
PAINLEVEL_OUTOF10: 0

## 2023-03-03 ASSESSMENT — PAIN - FUNCTIONAL ASSESSMENT: PAIN_FUNCTIONAL_ASSESSMENT: 0-10

## 2023-03-03 ASSESSMENT — PAIN DESCRIPTION - ORIENTATION: ORIENTATION: LEFT

## 2023-03-03 ASSESSMENT — PAIN DESCRIPTION - LOCATION: LOCATION: BACK

## 2023-03-03 NOTE — H&P
Patient:  Marina Forte  YOB: 1955  Medical Record #:  [id number]   Place: [address] [facility]  Date:  3/3/2023   Physician:  Fernie Lemos MD    History Obtained From: electronic medical record    HISTORY OF PRESENT ILLNESS    Past Medical History:        Diagnosis Date    Absence of teeth     front tooth flipper    Arthritis     hands    Carpal tunnel syndrome     Colitis     microscopic    Fractures     Osteoarthritis     Wears glasses     reading    Wears partial dentures     lower     Past Surgical History:        Procedure Laterality Date    ANKLE FRACTURE SURGERY Right 12/14/2020    OPEN REDUCTION INTERNAL FIXATION RIGHT ANKLE performed by Brenton Rose MD at [facility] Right 4/26/2021    RIGHT ANKLE SYNDESMOSIS SCREW REMOVAL performed by Brenton Rose MD at [address] [facility] [place] [place] [facility]  11/29/12    right    CARPAL TUNNEL RELEASE  12/20/12    Left    [facility]    COLONOSCOPY  9/2006    kindrupal, colitis    COLONOSCOPY N/A 5/31/2022    COLONOSCOPY WITH BIOPSY performed by Tonya Gregg MD at [address] [place] [address] Bilateral     cataract removal    JOINT REPLACEMENT Right 2010    right hip     NERVE BLOCK Right 2/17/2023    RIGHT SCIATIC NERVE BLOCK performed by Liz Marroquin MD at [address] Right 2/3/2023    LUMBAR EPIDURAL STEROID INJECTION RIGHT L4-5 performed by Liz Marroquin MD at [facility] ENDOSCOPY     Medications Prior to Admission:   No current facility-administered medications on file prior to encounter. Current Outpatient Medications on File Prior to Encounter   Medication Sig Dispense Refill    HYDROcodone-acetaminophen (NORCO)  MG per tablet Take 1 tablet by mouth every 6 hours as needed for Pain.       ibuprofen (ADVIL;MOTRIN) 200 MG tablet Take 200 mg by mouth every 6 hours as needed for Pain      albuterol sulfate HFA (PROVENTIL HFA) 108 (90 Base) MCG/ACT inhaler Inhale 2 puffs into the lungs every 6 hours as needed for Wheezing or Shortness of Breath 18 g 0    ondansetron (ZOFRAN ODT) 4 MG disintegrating tablet Take 1 tablet by mouth every 8 hours as needed for Nausea or Vomiting Let dissolve in mouth. 10 tablet 0    Aspirin-Acetaminophen-Caffeine (EXCEDRIN PO) Take by mouth      loratadine (CLARITIN) 10 MG capsule Take 10 mg by mouth daily      fluticasone (FLONASE) 50 MCG/ACT nasal spray 1 spray by Each Nostril route daily      Multiple Minerals-Vitamins (CITRACAL MAXIMUM PLUS) TABS Take 1 tablet by mouth daily      NONFORMULARY daily Protanbim--OTC supplement for alzhiemers      Ascorbic Acid (VITAMIN C) 250 MG tablet Take 250 mg by mouth every evening      Turmeric 500 MG TABS Take 1,000 mg by mouth every evening      Vitamin A 2400 MCG (8000 UT) TABS Take by mouth every evening      Cholecalciferol (VITAMIN D3) 125 MCG (5000 UT) TABS Take by mouth every evening      Omega-3 Fatty Acids (FISH OIL) 1000 MG CPDR Take 3,000 mg by mouth every evening      Misc Natural Products (AIRBORNE ELDERBERRY) CHEW Take 50 mg by mouth every evening      Multiple Vitamins-Minerals (THERAPEUTIC MULTIVITAMIN-MINERALS) tablet Take 1 tablet by mouth daily      Coenzyme Q10 (CO Q 10 PO) Take 100 mg by mouth every evening       MAGNESIUM PO Take 500 mg by mouth daily        Allergies:  Erythromycin  Social History     Socioeconomic History    Marital status:      Spouse name: Not on file    Number of children: Not on file    Years of education: Not on file    Highest education level: Not on file   Occupational History    Occupation:    Tobacco Use    Smoking status: Every Day     Packs/day: 1.00     Years: 46.00     Pack years: 46.00     Types: Cigarettes     Start date: 7/31/1972    Smokeless tobacco: Never   Vaping Use    Vaping Use: Never used   Substance and Sexual Activity    Alcohol use: Not Currently     Comment: seldom     Drug use: Yes     Types: Marijuana David Dinh)     Comment: 3-4 x a month    Sexual activity: Yes     Partners: Male   Other Topics Concern    Not on file   Social History Narrative    Not on file     Social Determinants of Health     Financial Resource Strain: Not on file   Food Insecurity: Not on file   Transportation Needs: Not on file   Physical Activity: Inactive    Days of Exercise per Week: 0 days    Minutes of Exercise per Session: 0 min   Stress: Not on file   Social Connections: Not on file   Intimate Partner Violence: Not At Risk    Fear of Current or Ex-Partner: No    Emotionally Abused: No    Physically Abused: No    Sexually Abused: No   Housing Stability: Not on file     Family History   Problem Relation Age of Onset    Alzheimer's Disease Mother     Stroke Father     Cancer Other     Diabetes Other     Heart Disease Other     Hypertension Other            ASSESSMENT AND PLAN:    1. Procedure. options, risks and benefits reviewed with patient and expresses understanding.

## 2023-03-03 NOTE — DISCHARGE INSTRUCTIONS
[facility]   [address] Jhonny Neely, [address] [address]   [address], [address] [place] [address]  [phone]      Patient:  Elfego Jensen  YOB: 1955  Medical Record #:  [id number]   Date:  3/3/2023   Physician:  Marjorie Llanes MD    Procedure Performed: [unfilled]     Discharge Instructions    Notify Pain Management Services if any of the following occur:    Redness/Swelling at the injection site lasting longer than 2 days  Fever (with redness, swelling, or drainage at the injection site)  Drainage at the injection site  New weakness/ numbness  Severe headache   Loss of bowel/ bladder function    General Instructions: You may experience numbness for several hours following your treatment. You should be cautious using those areas which are numb. Once the numbness wears off, you may apply ice or heat to injection site, if needed. Do not return to work or drive today    Rest today and return to normal activities tomorrow. On average, the steroid takes about 1 week to work and can be up to 2 weeks    You should continue to depend on your primary physician for your medical management of conditions not related to your pain management treatment. Continue to take all your other medications, including blood thinners, as directed by your primary physician unless otherwise instructed. NO changes have been made to your medications. Any changes listed on the discharge are based on patient self reporting. Any EMR warnings regarding drug/drug interactions were dismissed based on current use by the patient, management by prescribing physician and pharmacist and not managed by this physician. Any problem which relates specifically to a treatment or procedure performed today should be directed to the [facility].        [facility]  [address] [facility], [address] [place] [address], [address]  [phone]

## 2023-03-03 NOTE — OP NOTE
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 BRIEF HISTORY:  The patient presents today to [facility] for a scheduled sciatic nerve block procedure. The patient was re-evaluated today and is clinically unchanged as compared to my previous evaluation. The patient is clinically stable to proceed with the procedure. PROCEDURE NOTE:  The procedure was again explained to the patient and the previously distributed pre-procedure literature was reviewed. The options, rationale, and benefits of the procedure including pain relief, functional improvement, and increased mobility, as well as the risks of the procedure including but not limited to infection, bleeding, paresthesia, pain, failure to relieve pain, increased pain, headache, allergic reaction, neurologic impairment, local anesthetic, toxicity, and side effects and the potential side effects of corticosteroids were discussed with the patient and informed written consent was obtained from the patient. The patient was brought to the fluoroscopy suite and placed in the prone position. The LEFT sacral and gluteal area was prepped in the usual sterile fashion with Chloraprep and then draped. Intermittent AP fluoroscopy was utilized to localize the radiographic landmarks. A standard perisciatic nerve block approach was used. The sciatic notch, at its superolateral border was localized at its junction with the ilium rostral to the acetabulum and lateral to the SI joint and lateral to the sciatic nerve. It was localized at the radiographic marker of the overlying sciatic nerve between the lateral aspect of the sacrum and the greater trochanter.    The superficial skin projection was anesthetized with buffered lidocaine 1% 2 cc using a 27-gauge needle. A spinal needle was then inserted slowly under direct intermittent AP fluoroscopy towards the ilium and the overlying piriformis muscle. Negative aspiration was re-demonstrated and therapeutic injectate consisting of 6 cc of lidocaine 1%, 1 cc of bupivacaine 0.5% and 1 cc of Depo-Medrol 40 mg/cc was injected without pain, paresthesia, difficulty, or complaints. The needle was removed, the area cleansed, a Band-Aid placed over the injection site. Patient tolerated the procedure well. There were no complications. The patient was transferred, by wheelchair or stretcher, with accompaniment to the recovery area where the vital signs remained stable. There was sensory or motor blockade in the lower extremity. This procedure was done using local anesthesia only. The patient was discharged in stable condition accompanied with an escort after fulfilling standard discharge criteria. FLUOROSCOPY:  A fluoroscopy unit was utilized to obtain fluoroscopic images for intra-procedural use and assistance. Fluoroscopy was utilized to identify anatomic and radiographic landmarks for the accompanying procedure guidance and not for diagnostic purposes.       Estimated Blood Loss: 0ml      Plan:  Follow up in 4-6 weeks      Office: [phone]

## 2023-05-17 NOTE — PROGRESS NOTES
[facility] ENDOSCOPY [facility]PAT[facility]  PRE-PROCEDURE INSTRUCTIONS    Procedure date__05/19/2023_______Arrival time____0815________        Procedure time____0915________       Screening questions for Pain procedures:  Do you have a current infection? __N_______  Are you currently taking an antibiotic?_N____  Are you taking a blood thinner?_N_______    It is not necessary to stop eating or drinking prior to this procedure. We would like you to take your medications for blood pressure as usual.  You may be asked to stop blood thinners such as Coumadin, Plavix, Fragmin, Lovenox, etc., or any anti-inflammatories such as:  Aspirin, Ibuprofen, Advil, Naproxen prior to your procedure. We also ask that you stop any OTC medications that cause additional bleeding    You must make arrangements for a responsible adult to arrive with you and stay in our waiting area during your procedure. They will also need to take you home after your procedure. For your safety you will not be allowed to leave alone or drive yourself home. Also for your safety, it is strongly suggested that someone stay with you the first 24 hours after your procedure. For your comfort, please wear simple loose fitting clothing to the center. Please do not bring valuables. If you have a living will and a durable power of  for healthcare, please bring in a copy.     You will need to bring a photo ID and insurance card    Our goal is to provide you with excellent care so if you have any questions, please contact us at the [facility] at [phone]

## 2023-05-19 ENCOUNTER — APPOINTMENT (OUTPATIENT)
Dept: INTERVENTIONAL RADIOLOGY/VASCULAR | Age: 68
End: 2023-05-19
Attending: ANESTHESIOLOGY
Payer: MEDICARE

## 2023-05-19 ENCOUNTER — HOSPITAL ENCOUNTER (OUTPATIENT)
Age: 68
Setting detail: OUTPATIENT SURGERY
Discharge: HOME | End: 2023-05-19
Attending: ANESTHESIOLOGY | Admitting: ANESTHESIOLOGY
Payer: MEDICARE

## 2023-05-19 VITALS
BODY MASS INDEX: 20.25 KG/M2 | DIASTOLIC BLOOD PRESSURE: 74 MMHG | RESPIRATION RATE: 18 BRPM | HEART RATE: 72 BPM | OXYGEN SATURATION: 100 % | WEIGHT: 118.6 LBS | HEIGHT: 64 IN | TEMPERATURE: 96.9 F | SYSTOLIC BLOOD PRESSURE: 141 MMHG

## 2023-05-19 PROCEDURE — 3610000054 HC PAIN LEVEL 3 BASE (NON-OR): Performed by: ANESTHESIOLOGY

## 2023-05-19 PROCEDURE — 6360000002 HC RX W HCPCS: Performed by: ANESTHESIOLOGY

## 2023-05-19 PROCEDURE — 2709999900 HC NON-CHARGEABLE SUPPLY: Performed by: ANESTHESIOLOGY

## 2023-05-19 PROCEDURE — 6360000004 HC RX CONTRAST MEDICATION: Performed by: ANESTHESIOLOGY

## 2023-05-19 RX ORDER — METHYLPREDNISOLONE ACETATE 80 MG/ML
INJECTION, SUSPENSION INTRA-ARTICULAR; INTRALESIONAL; INTRAMUSCULAR; SOFT TISSUE
Status: COMPLETED | OUTPATIENT
Start: 2023-05-19 | End: 2023-05-19

## 2023-05-19 ASSESSMENT — PAIN DESCRIPTION - DESCRIPTORS
DESCRIPTORS: ACHING;BURNING;TINGLING;NUMBNESS
DESCRIPTORS: NUMBNESS
DESCRIPTORS: NUMBNESS

## 2023-05-19 ASSESSMENT — PAIN SCALES - GENERAL
PAINLEVEL_OUTOF10: 6
PAINLEVEL_OUTOF10: 5

## 2023-05-19 ASSESSMENT — PAIN DESCRIPTION - FREQUENCY: FREQUENCY: CONTINUOUS

## 2023-05-19 ASSESSMENT — PAIN DESCRIPTION - LOCATION
LOCATION: LEG
LOCATION: LEG

## 2023-05-19 ASSESSMENT — PAIN DESCRIPTION - ORIENTATION: ORIENTATION: RIGHT

## 2023-05-19 NOTE — H&P
Patient:  Lindsay Berrios  YOB: 1955  Medical Record #:  [id number]   Place: [facility] [facility]  Date:  5/19/2023   Physician:  Bhargav Frost MD    History Obtained From: electronic medical record    HISTORY OF PRESENT ILLNESS    Past Medical History:        Diagnosis Date    Absence of teeth     front tooth flipper    Arthritis     hands    Carpal tunnel syndrome     Colitis     microscopic    Fractures     Osteoarthritis     Wears glasses     reading    Wears partial dentures     lower     Past Surgical History:        Procedure Laterality Date    ANKLE FRACTURE SURGERY Right 12/14/2020    OPEN REDUCTION INTERNAL FIXATION RIGHT ANKLE performed by Naty Izquierdo MD at [facility] Right 4/26/2021    RIGHT ANKLE SYNDESMOSIS SCREW REMOVAL performed by Naty Izquierdo MD at [facility] [facility]  11/29/12    right    CARPAL TUNNEL RELEASE  12/20/12    Left    [address] [place] [address]    COLONOSCOPY  9/2006    kindel, colitis    COLONOSCOPY N/A 5/31/2022    COLONOSCOPY WITH BIOPSY performed by Arben Lr MD at [address] [place] [address] Bilateral     cataract removal    JOINT REPLACEMENT Right 2010    right hip     NERVE BLOCK Right 2/17/2023    RIGHT SCIATIC NERVE BLOCK performed by Junior Mathews MD at [address] Left 3/3/2023    LEFT SCIATIC NERVE BLOCK performed by Junior Mathews MD at [address] Right 2/3/2023    LUMBAR EPIDURAL STEROID INJECTION RIGHT L4-5 performed by Junior Mathews MD at [facility] ENDOSCOPY     Medications Prior to Admission:   No current facility-administered medications on file prior to encounter.      Current Outpatient Medications on File Prior to Encounter   Medication Sig Dispense Refill    Cetirizine HCl (ZYRTEC ALLERGY PO) Take by mouth      HYDROcodone-acetaminophen (NORCO)  MG per tablet Take 1 tablet

## 2023-05-19 NOTE — PROGRESS NOTES
Pt states mild numbness, mainly in toes, but no different than pre procedure.   Pt able to ambulate without difficulty

## 2023-05-19 NOTE — OP NOTE
paresthesia or occurrence of pain was encountered. Careful aspiration was negative for CSF and blood. A total of 1 cc Isovue 300 was injected yielding an epidurogram.    FLUOROSCOPY:  A fluoroscopy unit was utilized to obtain fluoroscopic images for intra-procedural use and assistance. Fluoroscopy was utilized to identify anatomic and radiographic landmarks for the accompanying procedure guidance and not for diagnostic purposes. After negative aspiration 4 cc of therapeutic injectate containing 1 cc of Depo-Medrol 80 mg/cc and 3 ml of lidocaine 1% was injected slowly in aliquots while clinically observing and monitoring the patient with negative aspiration demonstrated between aliquots of injections. At this time no paresthesia or occurrence of pain was present. The needle was then removed, the area was cleansed and a Band-Aid was placed over the injection site. There were no complications. The patient tolerated the procedure well. The procedure was performed using local anesthesia. The patient was transferred by wheelchair with accompaniment to the  Recovery Area and was monitored per protocol. The vital signs remained stable. The patient was discharged in stable condition accompanied by an escort with written instructions after fulfilling the standard discharge criteria. Written follow up instructions were given to the patient.     Estimated Blood Loss: 0ml    Plan:  Follow up in 6-8 weeks      Office: [phone]

## 2023-05-19 NOTE — DISCHARGE INSTRUCTIONS
[facility]   [address] [place]anna Stuart, [address] [address]   [address], [address] [place], [place] [phone]      Patient:  Merlinda Puma  YOB: 1955  Medical Record #:  [id number]   Date:  5/19/2023   Physician:  Yanely Montanez MD    Procedure Performed: [unfilled]     Discharge Instructions    Notify Pain Management Services if any of the following occur:    Redness/Swelling at the injection site lasting longer than 2 days  Fever (with redness, swelling, or drainage at the injection site)  Drainage at the injection site  New weakness/ numbness  Severe headache   Loss of bowel/ bladder function    General Instructions: You may experience numbness for several hours following your treatment. You should be cautious using those areas which are numb. Once the numbness wears off, you may apply ice or heat to injection site, if needed. Do not return to work or drive today    Rest today and return to normal activities tomorrow. On average, the steroid takes about 1 week to work and can be up to 2 weeks    You should continue to depend on your primary physician for your medical management of conditions not related to your pain management treatment. Continue to take all your other medications, including blood thinners, as directed by your primary physician unless otherwise instructed. NO changes have been made to your medications. Any changes listed on the discharge are based on patient self reporting. Any EMR warnings regarding drug/drug interactions were dismissed based on current use by the patient, management by prescribing physician and pharmacist and not managed by this physician. Any problem which relates specifically to a treatment or procedure performed today should be directed to the [facility].        [facility]  [phone]

## 2023-06-04 ENCOUNTER — APPOINTMENT (OUTPATIENT)
Dept: MRI IMAGING | Age: 68
DRG: 074 | End: 2023-06-04
Payer: MEDICARE

## 2023-06-04 ENCOUNTER — HOSPITAL ENCOUNTER (INPATIENT)
Age: 68
LOS: 1 days | Discharge: HOME | DRG: 074 | End: 2023-06-04
Attending: EMERGENCY MEDICINE | Admitting: INTERNAL MEDICINE
Payer: MEDICARE

## 2023-06-04 ENCOUNTER — APPOINTMENT (OUTPATIENT)
Dept: CT IMAGING | Age: 68
DRG: 074 | End: 2023-06-04
Payer: MEDICARE

## 2023-06-04 ENCOUNTER — APPOINTMENT (OUTPATIENT)
Dept: GENERAL RADIOLOGY | Age: 68
DRG: 074 | End: 2023-06-04
Payer: MEDICARE

## 2023-06-04 VITALS
DIASTOLIC BLOOD PRESSURE: 61 MMHG | TEMPERATURE: 97 F | HEART RATE: 79 BPM | HEIGHT: 64 IN | OXYGEN SATURATION: 100 % | RESPIRATION RATE: 20 BRPM | SYSTOLIC BLOOD PRESSURE: 124 MMHG | BODY MASS INDEX: 19.46 KG/M2 | WEIGHT: 114 LBS

## 2023-06-04 DIAGNOSIS — Z96.641 HISTORY OF TOTAL RIGHT HIP REPLACEMENT: ICD-10-CM

## 2023-06-04 DIAGNOSIS — M48.062 SPINAL STENOSIS OF LUMBAR REGION WITH NEUROGENIC CLAUDICATION: ICD-10-CM

## 2023-06-04 DIAGNOSIS — E78.2 MIXED HYPERLIPIDEMIA: ICD-10-CM

## 2023-06-04 DIAGNOSIS — S82.851A CLOSED TRIMALLEOLAR FRACTURE OF RIGHT ANKLE, INITIAL ENCOUNTER: ICD-10-CM

## 2023-06-04 DIAGNOSIS — R42 VERTIGO: ICD-10-CM

## 2023-06-04 DIAGNOSIS — F17.200 CURRENT SMOKER: ICD-10-CM

## 2023-06-04 DIAGNOSIS — F33.42 RECURRENT MAJOR DEPRESSIVE DISORDER, IN FULL REMISSION (HCC): ICD-10-CM

## 2023-06-04 DIAGNOSIS — R11.2 NAUSEA AND VOMITING, UNSPECIFIED VOMITING TYPE: ICD-10-CM

## 2023-06-04 DIAGNOSIS — M25.551 HIP PAIN, RIGHT: ICD-10-CM

## 2023-06-04 DIAGNOSIS — H81.23 VESTIBULAR NEURONITIS OF BOTH EARS: Primary | ICD-10-CM

## 2023-06-04 DIAGNOSIS — G56.03 BILATERAL CARPAL TUNNEL SYNDROME: ICD-10-CM

## 2023-06-04 DIAGNOSIS — R42 DIZZINESS: ICD-10-CM

## 2023-06-04 DIAGNOSIS — E04.1 THYROID NODULE: ICD-10-CM

## 2023-06-04 DIAGNOSIS — Z96.9 RETAINED ORTHOPEDIC HARDWARE: ICD-10-CM

## 2023-06-04 DIAGNOSIS — M80.00XA AGE-RELATED OSTEOPOROSIS WITH CURRENT PATHOLOGICAL FRACTURE, INITIAL ENCOUNTER: ICD-10-CM

## 2023-06-04 DIAGNOSIS — K52.9 COLITIS: ICD-10-CM

## 2023-06-04 DIAGNOSIS — M47.816 LUMBAR SPONDYLOSIS: ICD-10-CM

## 2023-06-04 LAB
ALBUMIN SERPL-MCNC: 4.6 G/DL (ref 3.4–5)
ALBUMIN/GLOB SERPL: 1.8 {RATIO} (ref 1.1–2.2)
ALP SERPL-CCNC: 64 U/L (ref 40–129)
ALT SERPL-CCNC: 16 U/L (ref 10–40)
ANION GAP SERPL CALCULATED.3IONS-SCNC: 13 MMOL/L (ref 3–16)
AST SERPL-CCNC: 30 U/L (ref 15–37)
BASOPHILS # BLD: 0 K/UL (ref 0–0.2)
BASOPHILS NFR BLD: 0.4 %
BILIRUB SERPL-MCNC: 0.3 MG/DL (ref 0–1)
BILIRUB UR QL STRIP.AUTO: NEGATIVE
BUN SERPL-MCNC: 16 MG/DL (ref 7–20)
CALCIUM SERPL-MCNC: 9.1 MG/DL (ref 8.3–10.6)
CHLORIDE SERPL-SCNC: 106 MMOL/L (ref 99–110)
CLARITY UR: CLEAR
CO2 SERPL-SCNC: 23 MMOL/L (ref 21–32)
COLOR UR: ABNORMAL
CREAT SERPL-MCNC: 0.5 MG/DL (ref 0.6–1.2)
DEPRECATED RDW RBC AUTO: 12.8 % (ref 12.4–15.4)
EKG ATRIAL RATE: 89 BPM
EKG DIAGNOSIS: NORMAL
EKG P AXIS: 52 DEGREES
EKG P-R INTERVAL: 132 MS
EKG Q-T INTERVAL: 444 MS
EKG QRS DURATION: 90 MS
EKG QTC CALCULATION (BAZETT): 540 MS
EKG R AXIS: 48 DEGREES
EKG T AXIS: 64 DEGREES
EKG VENTRICULAR RATE: 89 BPM
EOSINOPHIL # BLD: 0 K/UL (ref 0–0.6)
EOSINOPHIL NFR BLD: 0.4 %
GFR SERPLBLD CREATININE-BSD FMLA CKD-EPI: >60 ML/MIN/{1.73_M2}
GLUCOSE BLD-MCNC: 123 MG/DL (ref 70–99)
GLUCOSE SERPL-MCNC: 120 MG/DL (ref 70–99)
GLUCOSE UR STRIP.AUTO-MCNC: NEGATIVE MG/DL
HCT VFR BLD AUTO: 39 % (ref 36–48)
HGB BLD-MCNC: 13 G/DL (ref 12–16)
HGB UR QL STRIP.AUTO: NEGATIVE
INR PPP: 0.93 (ref 0.84–1.16)
KETONES UR STRIP.AUTO-MCNC: 15 MG/DL
LEUKOCYTE ESTERASE UR QL STRIP.AUTO: NEGATIVE
LYMPHOCYTES # BLD: 1.1 K/UL (ref 1–5.1)
LYMPHOCYTES NFR BLD: 13.8 %
MCH RBC QN AUTO: 31.1 PG (ref 26–34)
MCHC RBC AUTO-ENTMCNC: 33.4 G/DL (ref 31–36)
MCV RBC AUTO: 93 FL (ref 80–100)
MONOCYTES # BLD: 0.6 K/UL (ref 0–1.3)
MONOCYTES NFR BLD: 7.6 %
NEUTROPHILS # BLD: 6.3 K/UL (ref 1.7–7.7)
NEUTROPHILS NFR BLD: 77.8 %
NITRITE UR QL STRIP.AUTO: NEGATIVE
PERFORMED ON: ABNORMAL
PH UR STRIP.AUTO: 5.5 [PH] (ref 5–8)
PLATELET # BLD AUTO: 187 K/UL (ref 135–450)
PMV BLD AUTO: 9 FL (ref 5–10.5)
POTASSIUM SERPL-SCNC: 3.6 MMOL/L (ref 3.5–5.1)
PROT SERPL-MCNC: 7.2 G/DL (ref 6.4–8.2)
PROT UR STRIP.AUTO-MCNC: NEGATIVE MG/DL
PROTHROMBIN TIME: 12.5 SEC (ref 11.5–14.8)
RBC # BLD AUTO: 4.19 M/UL (ref 4–5.2)
SODIUM SERPL-SCNC: 142 MMOL/L (ref 136–145)
SP GR UR STRIP.AUTO: 1.04 (ref 1–1.03)
TROPONIN, HIGH SENSITIVITY: <6 NG/L (ref 0–14)
UA COMPLETE W REFLEX CULTURE PNL UR: ABNORMAL
UA DIPSTICK W REFLEX MICRO PNL UR: ABNORMAL
URN SPEC COLLECT METH UR: ABNORMAL
UROBILINOGEN UR STRIP-ACNC: 0.2 E.U./DL
WBC # BLD AUTO: 8.1 K/UL (ref 4–11)

## 2023-06-04 PROCEDURE — 93010 ELECTROCARDIOGRAM REPORT: CPT | Performed by: INTERNAL MEDICINE

## 2023-06-04 PROCEDURE — 6370000000 HC RX 637 (ALT 250 FOR IP): Performed by: EMERGENCY MEDICINE

## 2023-06-04 PROCEDURE — 70551 MRI BRAIN STEM W/O DYE: CPT

## 2023-06-04 PROCEDURE — 36415 COLL VENOUS BLD VENIPUNCTURE: CPT

## 2023-06-04 PROCEDURE — 85025 COMPLETE CBC W/AUTO DIFF WBC: CPT

## 2023-06-04 PROCEDURE — 6360000002 HC RX W HCPCS: Performed by: EMERGENCY MEDICINE

## 2023-06-04 PROCEDURE — 96374 THER/PROPH/DIAG INJ IV PUSH: CPT

## 2023-06-04 PROCEDURE — 84484 ASSAY OF TROPONIN QUANT: CPT

## 2023-06-04 PROCEDURE — 6360000004 HC RX CONTRAST MEDICATION: Performed by: EMERGENCY MEDICINE

## 2023-06-04 PROCEDURE — 80053 COMPREHEN METABOLIC PANEL: CPT

## 2023-06-04 PROCEDURE — 99285 EMERGENCY DEPT VISIT HI MDM: CPT

## 2023-06-04 PROCEDURE — 93005 ELECTROCARDIOGRAM TRACING: CPT | Performed by: EMERGENCY MEDICINE

## 2023-06-04 PROCEDURE — 85610 PROTHROMBIN TIME: CPT

## 2023-06-04 PROCEDURE — 1200000000 HC SEMI PRIVATE

## 2023-06-04 PROCEDURE — 71045 X-RAY EXAM CHEST 1 VIEW: CPT

## 2023-06-04 PROCEDURE — 70450 CT HEAD/BRAIN W/O DYE: CPT

## 2023-06-04 PROCEDURE — 70498 CT ANGIOGRAPHY NECK: CPT

## 2023-06-04 PROCEDURE — 81003 URINALYSIS AUTO W/O SCOPE: CPT

## 2023-06-04 RX ORDER — ASPIRIN 81 MG/1
81 TABLET ORAL DAILY
Status: CANCELLED | OUTPATIENT
Start: 2023-06-04

## 2023-06-04 RX ORDER — ATORVASTATIN CALCIUM 40 MG/1
80 TABLET, FILM COATED ORAL NIGHTLY
Status: CANCELLED | OUTPATIENT
Start: 2023-06-04

## 2023-06-04 RX ORDER — MECLIZINE HCL 12.5 MG/1
12.5 TABLET ORAL 3 TIMES DAILY PRN
Status: DISCONTINUED | OUTPATIENT
Start: 2023-06-04 | End: 2023-06-04

## 2023-06-04 RX ORDER — POLYETHYLENE GLYCOL 3350 17 G/17G
17 POWDER, FOR SOLUTION ORAL DAILY PRN
Status: CANCELLED | OUTPATIENT
Start: 2023-06-04

## 2023-06-04 RX ORDER — MECLIZINE HCL 12.5 MG/1
25 TABLET ORAL 3 TIMES DAILY PRN
Status: DISCONTINUED | OUTPATIENT
Start: 2023-06-04 | End: 2023-06-04 | Stop reason: HOSPADM

## 2023-06-04 RX ORDER — MECLIZINE HYDROCHLORIDE 25 MG/1
25 TABLET ORAL 3 TIMES DAILY PRN
Qty: 15 TABLET | Refills: 0 | Status: SHIPPED | OUTPATIENT
Start: 2023-06-04 | End: 2023-06-09

## 2023-06-04 RX ORDER — PREDNISONE 20 MG/1
40 TABLET ORAL DAILY
Status: DISCONTINUED | OUTPATIENT
Start: 2023-06-04 | End: 2023-06-04 | Stop reason: HOSPADM

## 2023-06-04 RX ORDER — PREDNISONE 20 MG/1
40 TABLET ORAL DAILY
Qty: 10 TABLET | Refills: 0 | Status: SHIPPED | OUTPATIENT
Start: 2023-06-04 | End: 2023-06-09

## 2023-06-04 RX ORDER — ACETAMINOPHEN 650 MG/1
650 SUPPOSITORY RECTAL EVERY 4 HOURS PRN
Status: CANCELLED | OUTPATIENT
Start: 2023-06-04

## 2023-06-04 RX ORDER — ONDANSETRON 2 MG/ML
8 INJECTION INTRAMUSCULAR; INTRAVENOUS ONCE
Status: COMPLETED | OUTPATIENT
Start: 2023-06-04 | End: 2023-06-04

## 2023-06-04 RX ORDER — ASPIRIN 300 MG/1
300 SUPPOSITORY RECTAL DAILY
Status: CANCELLED | OUTPATIENT
Start: 2023-06-04

## 2023-06-04 RX ORDER — MECLIZINE HCL 12.5 MG/1
25 TABLET ORAL ONCE
Status: COMPLETED | OUTPATIENT
Start: 2023-06-04 | End: 2023-06-04

## 2023-06-04 RX ORDER — ACETAMINOPHEN 325 MG/1
650 TABLET ORAL EVERY 4 HOURS PRN
Status: CANCELLED | OUTPATIENT
Start: 2023-06-04

## 2023-06-04 RX ORDER — ONDANSETRON 2 MG/ML
4 INJECTION INTRAMUSCULAR; INTRAVENOUS EVERY 6 HOURS PRN
Status: CANCELLED | OUTPATIENT
Start: 2023-06-04

## 2023-06-04 RX ORDER — ENOXAPARIN SODIUM 100 MG/ML
40 INJECTION SUBCUTANEOUS DAILY
Status: CANCELLED | OUTPATIENT
Start: 2023-06-04

## 2023-06-04 RX ADMIN — MECLIZINE 25 MG: 12.5 TABLET ORAL at 04:26

## 2023-06-04 RX ADMIN — IOPAMIDOL 75 ML: 755 INJECTION, SOLUTION INTRAVENOUS at 04:14

## 2023-06-04 RX ADMIN — ONDANSETRON 8 MG: 2 INJECTION INTRAMUSCULAR; INTRAVENOUS at 04:28

## 2023-06-04 ASSESSMENT — PAIN - FUNCTIONAL ASSESSMENT: PAIN_FUNCTIONAL_ASSESSMENT: NONE - DENIES PAIN

## 2023-10-30 ENCOUNTER — OFFICE VISIT (OUTPATIENT)
Dept: INTERNAL MEDICINE CLINIC | Age: 68
End: 2023-10-30
Payer: MEDICARE

## 2023-10-30 VITALS
BODY MASS INDEX: 18.26 KG/M2 | DIASTOLIC BLOOD PRESSURE: 73 MMHG | TEMPERATURE: 98.3 F | SYSTOLIC BLOOD PRESSURE: 137 MMHG | OXYGEN SATURATION: 96 % | WEIGHT: 106.4 LBS | HEART RATE: 91 BPM

## 2023-10-30 DIAGNOSIS — R42 VERTIGO: Primary | ICD-10-CM

## 2023-10-30 DIAGNOSIS — E78.49 OTHER HYPERLIPIDEMIA: ICD-10-CM

## 2023-10-30 DIAGNOSIS — Z12.31 ENCOUNTER FOR SCREENING MAMMOGRAM FOR BREAST CANCER: ICD-10-CM

## 2023-10-30 DIAGNOSIS — Z11.59 NEED FOR HEPATITIS C SCREENING TEST: ICD-10-CM

## 2023-10-30 DIAGNOSIS — J30.2 SEASONAL ALLERGIC RHINITIS, UNSPECIFIED TRIGGER: ICD-10-CM

## 2023-10-30 DIAGNOSIS — R73.09 INCREASED BLOOD GLUCOSE: ICD-10-CM

## 2023-10-30 DIAGNOSIS — Z87.891 PERSONAL HISTORY OF TOBACCO USE: ICD-10-CM

## 2023-10-30 PROCEDURE — 99204 OFFICE O/P NEW MOD 45 MIN: CPT | Performed by: INTERNAL MEDICINE

## 2023-10-30 PROCEDURE — 1123F ACP DISCUSS/DSCN MKR DOCD: CPT | Performed by: INTERNAL MEDICINE

## 2023-10-30 PROCEDURE — G0296 VISIT TO DETERM LDCT ELIG: HCPCS | Performed by: INTERNAL MEDICINE

## 2023-10-30 RX ORDER — SULFASALAZINE 500 MG/1
1000 TABLET ORAL 2 TIMES DAILY
COMMUNITY
Start: 2023-10-06

## 2023-10-30 RX ORDER — MECLIZINE HYDROCHLORIDE 25 MG/1
25 TABLET ORAL 3 TIMES DAILY PRN
Qty: 30 TABLET | Refills: 0 | Status: SHIPPED | OUTPATIENT
Start: 2023-10-30 | End: 2023-11-09

## 2023-10-30 RX ORDER — ACETAMINOPHEN 500 MG
TABLET ORAL
COMMUNITY

## 2023-10-30 SDOH — ECONOMIC STABILITY: HOUSING INSECURITY
IN THE LAST 12 MONTHS, WAS THERE A TIME WHEN YOU DID NOT HAVE A STEADY PLACE TO SLEEP OR SLEPT IN A SHELTER (INCLUDING NOW)?: NO

## 2023-10-30 SDOH — ECONOMIC STABILITY: INCOME INSECURITY: HOW HARD IS IT FOR YOU TO PAY FOR THE VERY BASICS LIKE FOOD, HOUSING, MEDICAL CARE, AND HEATING?: NOT HARD AT ALL

## 2023-10-30 SDOH — ECONOMIC STABILITY: FOOD INSECURITY: WITHIN THE PAST 12 MONTHS, YOU WORRIED THAT YOUR FOOD WOULD RUN OUT BEFORE YOU GOT MONEY TO BUY MORE.: NEVER TRUE

## 2023-10-30 SDOH — ECONOMIC STABILITY: FOOD INSECURITY: WITHIN THE PAST 12 MONTHS, THE FOOD YOU BOUGHT JUST DIDN'T LAST AND YOU DIDN'T HAVE MONEY TO GET MORE.: NEVER TRUE

## 2023-10-30 ASSESSMENT — ENCOUNTER SYMPTOMS
SORE THROAT: 0
COUGH: 0
VOMITING: 0
ABDOMINAL PAIN: 0
NAUSEA: 1
SHORTNESS OF BREATH: 0
BLOOD IN STOOL: 0

## 2023-10-30 ASSESSMENT — PATIENT HEALTH QUESTIONNAIRE - PHQ9
SUM OF ALL RESPONSES TO PHQ QUESTIONS 1-9: 7
SUM OF ALL RESPONSES TO PHQ9 QUESTIONS 1 & 2: 3
SUM OF ALL RESPONSES TO PHQ QUESTIONS 1-9: 7
6. FEELING BAD ABOUT YOURSELF - OR THAT YOU ARE A FAILURE OR HAVE LET YOURSELF OR YOUR FAMILY DOWN: 0
3. TROUBLE FALLING OR STAYING ASLEEP: 0
1. LITTLE INTEREST OR PLEASURE IN DOING THINGS: 1
SUM OF ALL RESPONSES TO PHQ QUESTIONS 1-9: 7
9. THOUGHTS THAT YOU WOULD BE BETTER OFF DEAD, OR OF HURTING YOURSELF: 0
SUM OF ALL RESPONSES TO PHQ QUESTIONS 1-9: 7
8. MOVING OR SPEAKING SO SLOWLY THAT OTHER PEOPLE COULD HAVE NOTICED. OR THE OPPOSITE, BEING SO FIGETY OR RESTLESS THAT YOU HAVE BEEN MOVING AROUND A LOT MORE THAN USUAL: 0
4. FEELING TIRED OR HAVING LITTLE ENERGY: 3
10. IF YOU CHECKED OFF ANY PROBLEMS, HOW DIFFICULT HAVE THESE PROBLEMS MADE IT FOR YOU TO DO YOUR WORK, TAKE CARE OF THINGS AT HOME, OR GET ALONG WITH OTHER PEOPLE: 1
7. TROUBLE CONCENTRATING ON THINGS, SUCH AS READING THE NEWSPAPER OR WATCHING TELEVISION: 1
2. FEELING DOWN, DEPRESSED OR HOPELESS: 2
5. POOR APPETITE OR OVEREATING: 0

## 2023-10-30 ASSESSMENT — COLUMBIA-SUICIDE SEVERITY RATING SCALE - C-SSRS
3. HAVE YOU BEEN THINKING ABOUT HOW YOU MIGHT KILL YOURSELF?: NO
4. HAVE YOU HAD THESE THOUGHTS AND HAD SOME INTENTION OF ACTING ON THEM?: NO
5. HAVE YOU STARTED TO WORK OUT OR WORKED OUT THE DETAILS OF HOW TO KILL YOURSELF? DO YOU INTEND TO CARRY OUT THIS PLAN?: NO
7. DID THIS OCCUR IN THE LAST THREE MONTHS: NO

## 2023-11-02 DIAGNOSIS — H91.90 HEARING LOSS, UNSPECIFIED HEARING LOSS TYPE, UNSPECIFIED LATERALITY: Primary | ICD-10-CM

## 2023-11-13 ENCOUNTER — HOSPITAL ENCOUNTER (OUTPATIENT)
Dept: WOMENS IMAGING | Age: 68
Discharge: HOME | End: 2023-11-13
Payer: MEDICARE

## 2023-11-13 ENCOUNTER — OFFICE VISIT (OUTPATIENT)
Dept: ENT CLINIC | Age: 68
End: 2023-11-13
Payer: MEDICARE

## 2023-11-13 ENCOUNTER — PROCEDURE VISIT (OUTPATIENT)
Dept: AUDIOLOGY | Age: 68
End: 2023-11-13
Payer: MEDICARE

## 2023-11-13 VITALS — HEIGHT: 64 IN | WEIGHT: 105 LBS | BODY MASS INDEX: 17.93 KG/M2

## 2023-11-13 VITALS
HEIGHT: 64 IN | BODY MASS INDEX: 18.26 KG/M2 | SYSTOLIC BLOOD PRESSURE: 126 MMHG | HEART RATE: 88 BPM | OXYGEN SATURATION: 97 % | DIASTOLIC BLOOD PRESSURE: 67 MMHG

## 2023-11-13 DIAGNOSIS — R42 DIZZINESS: Primary | ICD-10-CM

## 2023-11-13 DIAGNOSIS — R42 DIZZINESS: ICD-10-CM

## 2023-11-13 DIAGNOSIS — H90.3 SENSORINEURAL HEARING LOSS (SNHL) OF BOTH EARS: Primary | ICD-10-CM

## 2023-11-13 DIAGNOSIS — H91.8X3 ASYMMETRICAL HEARING LOSS: ICD-10-CM

## 2023-11-13 DIAGNOSIS — Z12.31 ENCOUNTER FOR SCREENING MAMMOGRAM FOR BREAST CANCER: ICD-10-CM

## 2023-11-13 DIAGNOSIS — H90.3 SENSORINEURAL HEARING LOSS (SNHL) OF BOTH EARS: ICD-10-CM

## 2023-11-13 DIAGNOSIS — H81.02 MENIERE'S DISEASE OF LEFT EAR: ICD-10-CM

## 2023-11-13 PROCEDURE — 99204 OFFICE O/P NEW MOD 45 MIN: CPT | Performed by: OTOLARYNGOLOGY

## 2023-11-13 PROCEDURE — 92557 COMPREHENSIVE HEARING TEST: CPT | Performed by: AUDIOLOGIST

## 2023-11-13 PROCEDURE — 77067 SCR MAMMO BI INCL CAD: CPT

## 2023-11-13 PROCEDURE — 92567 TYMPANOMETRY: CPT | Performed by: AUDIOLOGIST

## 2023-11-13 PROCEDURE — 1123F ACP DISCUSS/DSCN MKR DOCD: CPT | Performed by: OTOLARYNGOLOGY

## 2023-11-13 NOTE — PROGRESS NOTES
[address]      Patient Name: Sergio [facility] Record Number:  [id number]  Primary Care Physician:  Dannielle Chou MD  Date of Consultation: 11/13/2023    Chief Complaint: Dizziness        HISTORY OF PRESENT ILLNESS  Tamiko Forte is a(n) 70 y.o. female who presents evaluation dizziness. The patient says that in June she had a severe dizzy episode that she described as true vertigo. She believes that she had a syncopal episode and vomited as well. She has had a few other episodes since that time. They can be brief, but can last a long time. She has hearing loss worse on the left than right. She was actually diagnosed with this 4 years ago. 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 She does not recall having any dizziness whenever the hearing loss started. She has not noticed a fluctuation in hearing nor does she describe a significant increase in tinnitus during the episodes. She does not have a history of migraine headaches. She does have some occasional mild headaches that are not necessarily correlated to the dizziness. REVIEW OF SYSTEMS  As above    PHYSICAL EXAM  GENERAL: No Acute Distress, Alert and Oriented, no Hoarseness, strong voice  EYES: EOMI, Anti-icteric  HENT:   Head: Normocephalic and atraumatic. Face:  Symmetric, facial nerve intact, no sinus tenderness  Right Ear: Normal external ear, normal external auditory canal, intact tympanic membrane with normal mobility and aerated middle ear  Left Ear: Normal external ear, normal external auditory canal, intact tympanic membrane with normal mobility and aerated middle ear  Mouth/Oral Cavity:  normal lips, Uvula is midline, no mucosal lesions, no trismus  Oropharynx/Larynx:  normal oropharynx  Nose:Normal external nasal appearance.     NECK: Normal range of motion, no thyromegaly, trachea is midline, no

## 2023-11-13 NOTE — PATIENT INSTRUCTIONS
[employer]. Care instructions adapted under license by [facility] ([facility]). If you have questions about a medical condition or this instruction, always ask your healthcare professional. [address] any warranty or liability for your use of this information.

## 2023-11-13 NOTE — PROGRESS NOTES
Larissa Beaver   1955, 70 y.o. female   [id number]       Referring Provider: Dale Lynch MD  Referral Type: In an order in [address] [place] [address]    Reason for Visit: Evaluation of suspected change in hearing, tinnitus, or balance. ADULT AUDIOLOGIC EVALUATION      Larissa Beaver is a 70 y.o. female seen today, 11/13/2023 , for an initial audiologic evaluation. Patient was seen by Justine Dee MD following today's evaluation. Patient was accompanied by her . AUDIOLOGIC AND OTHER PERTINENT MEDICAL HISTORY:      Maira Rosado noted aural fullness, tinnitus, dizziness, imbalance, and history of falls. Patient reports episodic dizziness described as true vertigo, loss of balance, disequilibrium, unsteadiness, headache, nausea, vomiting  that started June 2023 (following back surgery), lasts several seconds, and often occurs with quick head/ body movement, and laying on her left side. Patient notes at least one episode of syncope in June. She states she was diagnosed with Meniere's Disease in June of 2023 as well. Patient notes her hearing is worse in the left ear. She also notes intermittent pulsatile tinnitus in the left ear. Larissa Beaver denied otalgia, otorrhea, history of significant noise exposure, history of head trauma, history of ear surgery, and family history of hearing loss. Date: 11/13/2023     IMPRESSIONS:      AD:Hearing WNL sloping to Moderately-Severe SNHL, Excellent WRS, Type A tymp  AS: Mild sloping to Moderately-Severe SNHL, Excellent WRS, Type A tymp    Test results consistent with asymmetric Sensorineural hearing loss. Hearing loss significant enough to create hearing difficulty in some listening situations. Discussed hearing loss, tinnitus, hearing aids, and dizziness with patient.  Patient to follow medical recommendations per Justine Dee MD .    ASSESSMENT AND FINDINGS:     Otoscopy revealed: Clear ear canals bilaterally    RIGHT EAR:  Hearing Sensitivity: Normal

## 2023-11-20 ENCOUNTER — HOSPITAL ENCOUNTER (EMERGENCY)
Age: 68
Discharge: HOME | End: 2023-11-20
Attending: EMERGENCY MEDICINE
Payer: MEDICARE

## 2023-11-20 ENCOUNTER — APPOINTMENT (OUTPATIENT)
Dept: GENERAL RADIOLOGY | Age: 68
End: 2023-11-20
Attending: EMERGENCY MEDICINE
Payer: MEDICARE

## 2023-11-20 VITALS
BODY MASS INDEX: 18.22 KG/M2 | HEIGHT: 64 IN | DIASTOLIC BLOOD PRESSURE: 84 MMHG | OXYGEN SATURATION: 99 % | RESPIRATION RATE: 18 BRPM | TEMPERATURE: 98.5 F | WEIGHT: 106.7 LBS | SYSTOLIC BLOOD PRESSURE: 154 MMHG | HEART RATE: 82 BPM

## 2023-11-20 DIAGNOSIS — S92.355A CLOSED NONDISPLACED FRACTURE OF FIFTH METATARSAL BONE OF LEFT FOOT, INITIAL ENCOUNTER: Primary | ICD-10-CM

## 2023-11-20 PROCEDURE — 73630 X-RAY EXAM OF FOOT: CPT

## 2023-11-20 PROCEDURE — 99283 EMERGENCY DEPT VISIT LOW MDM: CPT

## 2023-11-20 ASSESSMENT — PAIN DESCRIPTION - LOCATION
LOCATION: FOOT

## 2023-11-20 ASSESSMENT — PAIN DESCRIPTION - DESCRIPTORS
DESCRIPTORS: ACHING

## 2023-11-20 ASSESSMENT — PAIN SCALES - GENERAL
PAINLEVEL_OUTOF10: 6
PAINLEVEL_OUTOF10: 8
PAINLEVEL_OUTOF10: 5

## 2023-11-20 ASSESSMENT — PAIN DESCRIPTION - ORIENTATION
ORIENTATION: LEFT

## 2023-11-20 ASSESSMENT — LIFESTYLE VARIABLES: HOW OFTEN DO YOU HAVE A DRINK CONTAINING ALCOHOL: NEVER

## 2023-11-20 ASSESSMENT — PAIN - FUNCTIONAL ASSESSMENT
PAIN_FUNCTIONAL_ASSESSMENT: 0-10
PAIN_FUNCTIONAL_ASSESSMENT: PREVENTS OR INTERFERES SOME ACTIVE ACTIVITIES AND ADLS
PAIN_FUNCTIONAL_ASSESSMENT: 0-10

## 2023-11-20 ASSESSMENT — PAIN DESCRIPTION - FREQUENCY: FREQUENCY: CONTINUOUS

## 2023-11-20 ASSESSMENT — PAIN DESCRIPTION - PAIN TYPE: TYPE: ACUTE PAIN

## 2023-11-20 NOTE — ED NOTES
Patient has worn a similar boot in the past.  Very comfortable with use.      Natty Philip RN  11/20/23 [id number]

## 2023-11-20 NOTE — ED TRIAGE NOTES
Three weeks ago she got up in the middle of the night to go to the bathroom an injured her foot. Initially there was a lot of swelling in and around her toes. The pain and bruising have improved, but wants it checked.

## 2023-11-21 ENCOUNTER — TELEPHONE (OUTPATIENT)
Dept: ORTHOPEDIC SURGERY | Age: 68
End: 2023-11-21

## 2023-11-21 NOTE — TELEPHONE ENCOUNTER
Called Pt's  per [facility]. No answer. Left voicemail to call back to schedule follow up appointment. Please Schedule Wednesday 11/29/23 at [facility] in any open slot.

## 2023-11-21 NOTE — TELEPHONE ENCOUNTER
Left voicemail to return call to schedule. Upon return phone call please schedule her Wednesday 11/29/23 at [facility] [place] at 8:45 or any opne slot.

## 2023-11-28 SDOH — HEALTH STABILITY: PHYSICAL HEALTH: ON AVERAGE, HOW MANY MINUTES DO YOU ENGAGE IN EXERCISE AT THIS LEVEL?: 0 MIN

## 2023-11-28 SDOH — HEALTH STABILITY: PHYSICAL HEALTH: ON AVERAGE, HOW MANY DAYS PER WEEK DO YOU ENGAGE IN MODERATE TO STRENUOUS EXERCISE (LIKE A BRISK WALK)?: 0 DAYS

## 2023-11-28 ASSESSMENT — SOCIAL DETERMINANTS OF HEALTH (SDOH)
WITHIN THE LAST YEAR, HAVE YOU BEEN AFRAID OF YOUR PARTNER OR EX-PARTNER?: NO
WITHIN THE LAST YEAR, HAVE YOU BEEN KICKED, HIT, SLAPPED, OR OTHERWISE PHYSICALLY HURT BY YOUR PARTNER OR EX-PARTNER?: NO
WITHIN THE LAST YEAR, HAVE YOU BEEN HUMILIATED OR EMOTIONALLY ABUSED IN OTHER WAYS BY YOUR PARTNER OR EX-PARTNER?: NO
WITHIN THE LAST YEAR, HAVE TO BEEN RAPED OR FORCED TO HAVE ANY KIND OF SEXUAL ACTIVITY BY YOUR PARTNER OR EX-PARTNER?: NO

## 2023-11-29 ENCOUNTER — OFFICE VISIT (OUTPATIENT)
Dept: ORTHOPEDIC SURGERY | Age: 68
End: 2023-11-29
Payer: MEDICARE

## 2023-11-29 VITALS — WEIGHT: 106 LBS | BODY MASS INDEX: 18.1 KG/M2 | HEIGHT: 64 IN

## 2023-11-29 DIAGNOSIS — S92.352A CLOSED FRACTURE OF BASE OF FIFTH METATARSAL BONE OF LEFT FOOT: Primary | ICD-10-CM

## 2023-11-29 DIAGNOSIS — M21.372 FOOT DROP, LEFT: ICD-10-CM

## 2023-11-29 PROCEDURE — 1123F ACP DISCUSS/DSCN MKR DOCD: CPT | Performed by: ORTHOPAEDIC SURGERY

## 2023-11-29 PROCEDURE — 28470 CLTX METATARSAL FX WO MNP EA: CPT | Performed by: ORTHOPAEDIC SURGERY

## 2023-11-29 PROCEDURE — 99214 OFFICE O/P EST MOD 30 MIN: CPT | Performed by: ORTHOPAEDIC SURGERY

## 2023-11-29 NOTE — PROGRESS NOTES
CHIEF COMPLAINT:   1- Left foot pain/ 5th MT base minimally displaced fracture. 2- Left foot drop. DATE OF INJURY:  Oct 2023    HISTORY:  Ms. Joycelyn Monroy 70 y.o.   female  presents today for the first visit for evaluation of a left foot injury which occurred when she fell. She has a foot drop and back surgery in 2023. She is complaining of lateral foot pain and swelling. Rates pain a 8/10 VAS. This is better with elevation and worse with bearing any wt. The pain is sharp and not radiating. No other complaint. She was seen 1st at [facility] ED, where she was x-rayed and splinted and asked to f/u with orthopaedics. The patient is known to me for right ankle trimalleolar fracture, with syndesmosis disruption, status post ORIF, 2020.      Past Medical History:   Diagnosis Date    Absence of teeth     front tooth flipper    Arthritis     hands    Carpal tunnel syndrome     Colitis     microscopic    Fractures     Osteoarthritis     Wears glasses     reading    Wears partial dentures     lower       Past Surgical History:   Procedure Laterality Date    ANKLE FRACTURE SURGERY Right 2020    OPEN REDUCTION INTERNAL FIXATION RIGHT ANKLE performed by Guillermo Pak MD at [facility] Right 2021    RIGHT ANKLE SYNDESMOSIS SCREW REMOVAL performed by Guillermo Pak MD at [address] [place] [address]  12    right    CARPAL TUNNEL RELEASE  12    Left     SECTION      COLONOSCOPY  2006    kindel, colitis    COLONOSCOPY N/A 2022    COLON[facility] WITH BIOPSY [facility] [address] [facility]     cataract removal    JOINT REPLACEMENT Right     right hip     NERVE BLOCK Right 2023    RIGHT SCIATIC NERVE BLOCK performed by Cinda Gallagher MD at [facility] Left 3/3/2023    LEFT SCIATIC NERVE BLOCK performed by Cinda Gallagher MD at

## 2023-12-13 DIAGNOSIS — H91.90 HEARING LOSS, UNSPECIFIED HEARING LOSS TYPE, UNSPECIFIED LATERALITY: Primary | ICD-10-CM

## 2023-12-26 NOTE — PROGRESS NOTES
report and results from today's appointment:   - Continue medical follow-up with Wiliam Pulido MD.   - Retest hearing as medically indicated and/or sooner if a change in hearing is noted. - If desired, schedule a Hearing Aid Evaluation (HAE) appointment to discuss hearing aid options. - Utilize \"Good Communication Strategies\" as discussed to assist in speech understanding with communication partners. - Maintain a sound enriched environment to assist in the management of tinnitus symptoms.  - Use hearing protection devices (HPDs), such as protective ear muffs and ear plugs, when exposed to dangerous sound levels. - If medically indicated, consider vestibular evaluation to further investigate symptoms of dizziness.        Corby Foy  Audiologist    Chart CC'd to: Wiliam Pulido MD      Degree of   Hearing Sensitivity dB Range   Within Normal Limits (WNL) 0 - 20   Mild 20 - 40   Moderate 40 - 55   Moderately-Severe 55 - 70   Severe 70 - 90   Profound 90 +

## 2023-12-27 ENCOUNTER — OFFICE VISIT (OUTPATIENT)
Dept: ENT CLINIC | Age: 68
End: 2023-12-27
Payer: MEDICARE

## 2023-12-27 ENCOUNTER — PROCEDURE VISIT (OUTPATIENT)
Dept: AUDIOLOGY | Age: 68
End: 2023-12-27
Payer: MEDICARE

## 2023-12-27 VITALS
BODY MASS INDEX: 18.54 KG/M2 | DIASTOLIC BLOOD PRESSURE: 73 MMHG | OXYGEN SATURATION: 97 % | HEART RATE: 85 BPM | SYSTOLIC BLOOD PRESSURE: 119 MMHG | WEIGHT: 108 LBS

## 2023-12-27 DIAGNOSIS — R42 DIZZINESS: Primary | ICD-10-CM

## 2023-12-27 DIAGNOSIS — H90.3 SENSORINEURAL HEARING LOSS (SNHL) OF BOTH EARS: ICD-10-CM

## 2023-12-27 DIAGNOSIS — R42 DIZZINESS: ICD-10-CM

## 2023-12-27 DIAGNOSIS — H90.3 SENSORINEURAL HEARING LOSS (SNHL) OF BOTH EARS: Primary | ICD-10-CM

## 2023-12-27 DIAGNOSIS — H91.8X3 ASYMMETRICAL HEARING LOSS: ICD-10-CM

## 2023-12-27 PROCEDURE — 99213 OFFICE O/P EST LOW 20 MIN: CPT | Performed by: OTOLARYNGOLOGY

## 2023-12-27 PROCEDURE — 1123F ACP DISCUSS/DSCN MKR DOCD: CPT | Performed by: OTOLARYNGOLOGY

## 2023-12-27 PROCEDURE — 92567 TYMPANOMETRY: CPT | Performed by: AUDIOLOGIST

## 2023-12-27 PROCEDURE — 92557 COMPREHENSIVE HEARING TEST: CPT | Performed by: AUDIOLOGIST

## 2024-01-02 ENCOUNTER — OFFICE VISIT (OUTPATIENT)
Dept: INTERNAL MEDICINE CLINIC | Age: 69
End: 2024-01-02
Payer: MEDICARE

## 2024-01-02 DIAGNOSIS — Z00.00 INITIAL MEDICARE ANNUAL WELLNESS VISIT: Primary | ICD-10-CM

## 2024-01-02 DIAGNOSIS — Z87.891 PERSONAL HISTORY OF TOBACCO USE: ICD-10-CM

## 2024-01-02 DIAGNOSIS — R26.89 BALANCE PROBLEM: ICD-10-CM

## 2024-01-02 PROCEDURE — 99406 BEHAV CHNG SMOKING 3-10 MIN: CPT | Performed by: INTERNAL MEDICINE

## 2024-01-02 PROCEDURE — G0438 PPPS, INITIAL VISIT: HCPCS | Performed by: INTERNAL MEDICINE

## 2024-01-02 PROCEDURE — 1123F ACP DISCUSS/DSCN MKR DOCD: CPT | Performed by: INTERNAL MEDICINE

## 2024-01-02 PROCEDURE — G0296 VISIT TO DETERM LDCT ELIG: HCPCS | Performed by: INTERNAL MEDICINE

## 2024-01-02 ASSESSMENT — PATIENT HEALTH QUESTIONNAIRE - PHQ9
9. THOUGHTS THAT YOU WOULD BE BETTER OFF DEAD, OR OF HURTING YOURSELF: 0
6. FEELING BAD ABOUT YOURSELF - OR THAT YOU ARE A FAILURE OR HAVE LET YOURSELF OR YOUR FAMILY DOWN: 0
4. FEELING TIRED OR HAVING LITTLE ENERGY: 0
2. FEELING DOWN, DEPRESSED OR HOPELESS: 1
3. TROUBLE FALLING OR STAYING ASLEEP: 0
SUM OF ALL RESPONSES TO PHQ QUESTIONS 1-9: 1
SUM OF ALL RESPONSES TO PHQ QUESTIONS 1-9: 1
10. IF YOU CHECKED OFF ANY PROBLEMS, HOW DIFFICULT HAVE THESE PROBLEMS MADE IT FOR YOU TO DO YOUR WORK, TAKE CARE OF THINGS AT HOME, OR GET ALONG WITH OTHER PEOPLE: 0
5. POOR APPETITE OR OVEREATING: 0
1. LITTLE INTEREST OR PLEASURE IN DOING THINGS: 0
SUM OF ALL RESPONSES TO PHQ9 QUESTIONS 1 & 2: 1
8. MOVING OR SPEAKING SO SLOWLY THAT OTHER PEOPLE COULD HAVE NOTICED. OR THE OPPOSITE, BEING SO FIGETY OR RESTLESS THAT YOU HAVE BEEN MOVING AROUND A LOT MORE THAN USUAL: 0
SUM OF ALL RESPONSES TO PHQ QUESTIONS 1-9: 1
SUM OF ALL RESPONSES TO PHQ QUESTIONS 1-9: 1
7. TROUBLE CONCENTRATING ON THINGS, SUCH AS READING THE NEWSPAPER OR WATCHING TELEVISION: 0

## 2024-01-02 NOTE — PATIENT INSTRUCTIONS
reliever, such as acetaminophen (Tylenol).   When should you call for help?   Call 911 if you have symptoms of a heart attack. These may include:    Chest pain or pressure, or a strange feeling in the chest.     Sweating.     Shortness of breath.     Pain, pressure, or a strange feeling in the back, neck, jaw, or upper belly or in one or both shoulders or arms.     Lightheadedness or sudden weakness.     A fast or irregular heartbeat.   After you call 911, the  may tell you to chew 1 adult-strength or 2 to 4 low-dose aspirin. Wait for an ambulance. Do not try to drive yourself.  Watch closely for changes in your health, and be sure to contact your doctor if you have any problems.  Where can you learn more?  Go to https://www.[employer].net/patientEd and enter F075 to learn more about \"A Healthy Heart: Care Instructions.\"  Current as of: June 25, 2023               Content Version: 13.9  © 1945-7159 [employer].   Care instructions adapted under license by [employer]. If you have questions about a medical condition or this instruction, always ask your healthcare professional. [employer] disclaims any warranty or liability for your use of this information.      Personalized Preventive Plan for Maira Rosado - 1/2/2024  Medicare offers a range of preventive health benefits. Some of the tests and screenings are paid in full while other may be subject to a deductible, co-insurance, and/or copay.    Some of these benefits include a comprehensive review of your medical history including lifestyle, illnesses that may run in your family, and various assessments and screenings as appropriate.    After reviewing your medical record and screening and assessments performed today your provider may have ordered immunizations, labs, imaging, and/or referrals for you.  A list of these orders (if applicable) as well as your Preventive Care list are included within your After Visit Summary for

## 2024-01-02 NOTE — PROGRESS NOTES
Medicare Annual Wellness Visit    Maira Rosado is here for Medicare AWV    Assessment & Plan   Initial Medicare annual wellness visit  Personal history of tobacco use  -     KS VISIT TO DISCUSS LUNG CA SCREEN W LDCT  -     CT Lung Screen (Initial or Annual); Future  Balance problem  -     External Referral To Home Health  Recommendations for Preventive Services Due: see orders and patient instructions/AVS.  Recommended screening schedule for the next 5-10 years is provided to the patient in written form: see Patient Instructions/AVS.     No follow-ups on file.     Subjective       Patient's complete Health Risk Assessment and screening values have been reviewed and are found in Flowsheets. The following problems were reviewed today and where indicated follow up appointments were made and/or referrals ordered.    Positive Risk Factor Screenings with Interventions:    Fall Risk:  Do you feel unsteady or are you worried about falling? : (!) yes  2 or more falls in past year?: no  Fall with injury in past year?: no     Interventions:    Reviewed medications, home hazards, visual acuity, and co-morbidities that can increase risk for falls  Referral: Physical Therapy (PT)       Drug Use:   Substance and Sexual Activity   Drug Use Yes    Types: Marijuana (Weed)    Comment: 3-4 x a month      DAST-10 Score: 0   Interpretation:  1-2: Low level - Monitor, re-assess at a later date  3-5: Moderate level - Further Investigation  6-8: Substantial level - Intensive Assessment  9-10: Severe level - Intensive Assessment  Interventions:  Patient declined any further intervention or treatment          Dentist Screen:  Have you seen the dentist within the past year?: (!) No    Intervention:  Advised to schedule with their dentist    Hearing Screen:  Do you or your family notice any trouble with your hearing that hasn't been managed with hearing aids?: (!) Yes    Interventions:  Follows with audiology    Vision Screen:  Do you have

## 2024-04-29 ENCOUNTER — OFFICE VISIT (OUTPATIENT)
Dept: INTERNAL MEDICINE CLINIC | Age: 69
End: 2024-04-29
Payer: MEDICARE

## 2024-04-29 VITALS
DIASTOLIC BLOOD PRESSURE: 70 MMHG | OXYGEN SATURATION: 94 % | WEIGHT: 109 LBS | HEART RATE: 80 BPM | BODY MASS INDEX: 18.61 KG/M2 | SYSTOLIC BLOOD PRESSURE: 130 MMHG | HEIGHT: 64 IN

## 2024-04-29 DIAGNOSIS — R73.09 INCREASED BLOOD GLUCOSE: ICD-10-CM

## 2024-04-29 DIAGNOSIS — Z87.891 PERSONAL HISTORY OF TOBACCO USE: ICD-10-CM

## 2024-04-29 DIAGNOSIS — E78.49 OTHER HYPERLIPIDEMIA: ICD-10-CM

## 2024-04-29 DIAGNOSIS — Z11.59 NEED FOR HEPATITIS C SCREENING TEST: ICD-10-CM

## 2024-04-29 DIAGNOSIS — R42 VERTIGO: Primary | ICD-10-CM

## 2024-04-29 DIAGNOSIS — J30.2 SEASONAL ALLERGIC RHINITIS, UNSPECIFIED TRIGGER: ICD-10-CM

## 2024-04-29 PROBLEM — F33.42 RECURRENT MAJOR DEPRESSIVE DISORDER, IN FULL REMISSION (HCC): Status: ACTIVE | Noted: 2024-04-29

## 2024-04-29 PROBLEM — F33.42 RECURRENT MAJOR DEPRESSIVE DISORDER, IN FULL REMISSION (HCC): Status: RESOLVED | Noted: 2024-04-29 | Resolved: 2024-04-29

## 2024-04-29 LAB
ALBUMIN SERPL-MCNC: 4.8 G/DL (ref 3.4–5)
ALBUMIN/GLOB SERPL: 2.4 {RATIO} (ref 1.1–2.2)
ALP SERPL-CCNC: 104 U/L (ref 40–129)
ALT SERPL-CCNC: 13 U/L (ref 10–40)
ANION GAP SERPL CALCULATED.3IONS-SCNC: 12 MMOL/L (ref 3–16)
AST SERPL-CCNC: 27 U/L (ref 15–37)
BILIRUB SERPL-MCNC: 0.3 MG/DL (ref 0–1)
BUN SERPL-MCNC: 14 MG/DL (ref 7–20)
CALCIUM SERPL-MCNC: 9.5 MG/DL (ref 8.3–10.6)
CHLORIDE SERPL-SCNC: 105 MMOL/L (ref 99–110)
CO2 SERPL-SCNC: 26 MMOL/L (ref 21–32)
CREAT SERPL-MCNC: 0.6 MG/DL (ref 0.6–1.2)
GFR SERPLBLD CREATININE-BSD FMLA CKD-EPI: >90 ML/MIN/{1.73_M2}
GLUCOSE SERPL-MCNC: 101 MG/DL (ref 70–99)
POTASSIUM SERPL-SCNC: 4.6 MMOL/L (ref 3.5–5.1)
PROT SERPL-MCNC: 6.8 G/DL (ref 6.4–8.2)
SODIUM SERPL-SCNC: 143 MMOL/L (ref 136–145)
TSH SERPL DL<=0.005 MIU/L-ACNC: 1.33 UIU/ML (ref 0.27–4.2)

## 2024-04-29 PROCEDURE — 1123F ACP DISCUSS/DSCN MKR DOCD: CPT | Performed by: INTERNAL MEDICINE

## 2024-04-29 PROCEDURE — 99214 OFFICE O/P EST MOD 30 MIN: CPT | Performed by: INTERNAL MEDICINE

## 2024-04-29 PROCEDURE — G2211 COMPLEX E/M VISIT ADD ON: HCPCS | Performed by: INTERNAL MEDICINE

## 2024-04-29 PROCEDURE — 36415 COLL VENOUS BLD VENIPUNCTURE: CPT | Performed by: INTERNAL MEDICINE

## 2024-04-29 RX ORDER — MECLIZINE HCL 12.5 MG/1
12.5 TABLET ORAL 3 TIMES DAILY PRN
Qty: 30 TABLET | Refills: 1 | Status: SHIPPED | OUTPATIENT
Start: 2024-04-29 | End: 2024-05-29

## 2024-04-29 ASSESSMENT — ENCOUNTER SYMPTOMS
COUGH: 0
VOMITING: 0
SORE THROAT: 0
BLOOD IN STOOL: 0
SHORTNESS OF BREATH: 0
ABDOMINAL PAIN: 0
NAUSEA: 0

## 2024-04-29 NOTE — PROGRESS NOTES
Maira Rosado (:  1955) is a 68 y.o. female, New patient, here for evaluation of the following chief complaint(s):  Check-Up and Medication Refill (Meclizine )      Assessment & Plan   ASSESSMENT/PLAN:  1. Vertigo  Chronic, stable  Continue as needed vertigo for dizziness  -     meclizine (ANTIVERT) 12.5 MG tablet; Take 1 tablet by mouth 3 times daily as needed for Dizziness, Disp-30 tablet, R-1Normal  -     Comprehensive Metabolic Panel    2. Seasonal allergic rhinitis, unspecified trigger  - Stable   - Continue current dose of Flonase nasal spray and Zyrtec as needed for allergies.  -     Comprehensive Metabolic Panel    3. Need for hepatitis C screening test  -     Hepatitis C Antibody; Future    4. Increased blood glucose  -     TSH with Reflex; Future  -     Hemoglobin A1C; Future    5. Other hyperlipidemia  -     Lipid Panel; Future  -     TSH with Reflex; Future    6. Personal history of tobacco use  -     CT Lung Screen (Initial or Annual)          Return in about 6 months (around 10/29/2024).         Subjective   SUBJECTIVE/OBJECTIVE:  Dizziness  This is a chronic problem. The current episode started more than 1 month ago. The problem occurs intermittently. The problem has been unchanged. Pertinent negatives include no abdominal pain, chest pain, coughing, fatigue, fever, nausea, sore throat, vomiting or weakness. The symptoms are aggravated by bending and twisting. Treatments tried: Meclizine. The treatment provided significant relief.       Review of Systems   Constitutional:  Negative for fatigue and fever.   HENT:  Negative for nosebleeds and sore throat.    Respiratory:  Negative for cough and shortness of breath.    Cardiovascular:  Negative for chest pain, palpitations and leg swelling.   Gastrointestinal:  Negative for abdominal pain, blood in stool, nausea and vomiting.   Neurological:  Positive for dizziness (Intermittent). Negative for weakness.          Objective   Physical

## 2024-04-30 LAB
CHOLEST SERPL-MCNC: 213 MG/DL (ref 0–199)
EST. AVERAGE GLUCOSE BLD GHB EST-MCNC: 73.8 MG/DL
HBA1C MFR BLD: 4.2 %
HCV AB SERPL QL IA: NORMAL
HDLC SERPL-MCNC: 78 MG/DL (ref 40–60)
LDLC SERPL CALC-MCNC: 98 MG/DL
TRIGL SERPL-MCNC: 184 MG/DL (ref 0–150)
VLDLC SERPL CALC-MCNC: 37 MG/DL

## 2024-06-27 ENCOUNTER — OFFICE VISIT (OUTPATIENT)
Dept: ENT CLINIC | Age: 69
End: 2024-06-27
Payer: MEDICARE

## 2024-06-27 ENCOUNTER — TELEPHONE (OUTPATIENT)
Dept: ENT CLINIC | Age: 69
End: 2024-06-27

## 2024-06-27 VITALS
DIASTOLIC BLOOD PRESSURE: 62 MMHG | SYSTOLIC BLOOD PRESSURE: 125 MMHG | BODY MASS INDEX: 19.46 KG/M2 | OXYGEN SATURATION: 99 % | HEIGHT: 64 IN | HEART RATE: 81 BPM | WEIGHT: 114 LBS

## 2024-06-27 DIAGNOSIS — R42 DIZZINESS: ICD-10-CM

## 2024-06-27 DIAGNOSIS — H91.8X3 ASYMMETRICAL HEARING LOSS: Primary | ICD-10-CM

## 2024-06-27 PROCEDURE — 1123F ACP DISCUSS/DSCN MKR DOCD: CPT | Performed by: OTOLARYNGOLOGY

## 2024-06-27 PROCEDURE — 99213 OFFICE O/P EST LOW 20 MIN: CPT | Performed by: OTOLARYNGOLOGY

## 2024-06-27 NOTE — PROGRESS NOTES
[facility]  DIVISION OF OTOLARYNGOLOGY- HEAD & NECK SURGERY  Follow up      Patient Name: Maira Rosado  Medical Record Number:  [id number]  Primary Care Physician:  Jeremiah Calderon MD  Date of Consultation: 6/27/2024    Chief Complaint: Ear issues        Interval History    Patient following up for her ears.  She says that her main complaint today is that she does not hear well.  She thinks that the left ear is fairly stable, but sometimes does fluctuate.  She does seem to be related to allergy at time.  She has not had dizziness in about a month.  She takes Dramamine all the time.          REVIEW OF SYSTEMS  As above    PHYSICAL EXAM  GENERAL: No Acute Distress, Alert and Oriented, no Hoarseness, strong voice  EYES: EOMI, Anti-icteric  HENT:   Head: Normocephalic and atraumatic.   Face:  Symmetric, facial nerve intact, no sinus tenderness  Right Ear: Normal external ear, normal external auditory canal, intact tympanic membrane with normal mobility and aerated middle ear  Left Ear: Normal external ear, normal external auditory canal, intact tympanic membrane with normal mobility and aerated middle ear  Mouth/Oral Cavity:  normal lips, Uvula is midline, no mucosal lesions, no trismus  Oropharynx/Larynx:  normal oropharynx  Nose:Normal external nasal appearance.    NECK: Normal range of motion, no thyromegaly, trachea is midline, no lymphadenopathy, no neck masses, no crepitus                  ASSESSMENT/PLAN  1. Asymmetrical hearing loss  I suspect she has Ménière's disease based on her hearing loss and symptoms.  She is interested in hearing aids and I agree that this would benefit her significantly.    Typically would base the treatment of Ménière's disease on how well-controlled the dizziness is.  She has not had dizziness in a month.  I do not really want him to take Dramamine daily as I do not think is a good long-term solution.  If she has more dizziness I think we should consider starting her on

## 2024-07-09 DIAGNOSIS — H91.90 HEARING LOSS, UNSPECIFIED HEARING LOSS TYPE, UNSPECIFIED LATERALITY: Primary | ICD-10-CM

## 2024-07-15 ENCOUNTER — HOSPITAL ENCOUNTER (EMERGENCY)
Age: 69
Discharge: HOME | End: 2024-07-15
Attending: EMERGENCY MEDICINE
Payer: MEDICARE

## 2024-07-15 VITALS
WEIGHT: 113.76 LBS | RESPIRATION RATE: 16 BRPM | OXYGEN SATURATION: 97 % | BODY MASS INDEX: 19.42 KG/M2 | HEART RATE: 81 BPM | HEIGHT: 64 IN | SYSTOLIC BLOOD PRESSURE: 148 MMHG | TEMPERATURE: 97.7 F | DIASTOLIC BLOOD PRESSURE: 79 MMHG

## 2024-07-15 DIAGNOSIS — S41.112A LACERATION OF LEFT UPPER EXTREMITY, INITIAL ENCOUNTER: Primary | ICD-10-CM

## 2024-07-15 PROCEDURE — 99284 EMERGENCY DEPT VISIT MOD MDM: CPT

## 2024-07-15 PROCEDURE — 90471 IMMUNIZATION ADMIN: CPT | Performed by: EMERGENCY MEDICINE

## 2024-07-15 PROCEDURE — 12001 RPR S/N/AX/GEN/TRNK 2.5CM/<: CPT

## 2024-07-15 PROCEDURE — 90715 TDAP VACCINE 7 YRS/> IM: CPT | Performed by: EMERGENCY MEDICINE

## 2024-07-15 PROCEDURE — 6360000002 HC RX W HCPCS: Performed by: EMERGENCY MEDICINE

## 2024-07-15 RX ORDER — DIMENHYDRINATE 50 MG
25 TABLET ORAL DAILY
COMMUNITY

## 2024-07-15 RX ADMIN — TETANUS TOXOID, REDUCED DIPHTHERIA TOXOID AND ACELLULAR PERTUSSIS VACCINE, ADSORBED 0.5 ML: 5; 2.5; 8; 8; 2.5 SUSPENSION INTRAMUSCULAR at 18:52

## 2024-07-15 ASSESSMENT — PAIN SCALES - GENERAL: PAINLEVEL_OUTOF10: 8

## 2024-07-15 ASSESSMENT — PAIN - FUNCTIONAL ASSESSMENT: PAIN_FUNCTIONAL_ASSESSMENT: 0-10

## 2024-07-15 NOTE — ED PROVIDER NOTES
light touch of all digits and good cap refill of all digits.  SKIN: Warm and dry.    NEUROLOGICAL: Alert and oriented.     Lac Repair    Date/Time: 7/15/2024 6:03 PM    Performed by: Martinez Buchanan MD  Authorized by: Martinez Buchanan MD    Consent:     Consent obtained:  Verbal    Consent given by:  Patient    Risks, benefits, and alternatives were discussed: yes      Risks discussed:  Infection, need for additional repair, poor cosmetic result, poor wound healing and pain    Alternatives discussed:  No treatment  Universal protocol:     Procedure explained and questions answered to patient or proxy's satisfaction: yes      Patient identity confirmed:  Verbally with patient  Anesthesia:     Anesthesia method:  Local infiltration    Local anesthetic:  Lidocaine 1% w/o epi  Laceration details:     Location:  Shoulder/arm    Shoulder/arm location:  L lower arm    Length (cm):  2    Depth (mm):  8  Pre-procedure details:     Preparation:  Patient was prepped and draped in usual sterile fashion  Exploration:     Hemostasis achieved with:  Direct pressure    Wound exploration: wound explored through full range of motion and entire depth of wound visualized      Wound extent: no fascia violation noted, no foreign bodies/material noted, no muscle damage noted, no nerve damage noted, no tendon damage noted, no underlying fracture noted and no vascular damage noted      Contaminated: no    Treatment:     Area cleansed with:  Chlorhexidine    Amount of cleaning:  Standard    Irrigation solution:  Sterile water    Irrigation volume:  260    Irrigation method:  Syringe    Debridement:  None    Undermining:  None  Skin repair:     Repair method:  Sutures    Suture size:  4-0    Suture material:  Nylon    Suture technique:  Simple interrupted    Number of sutures:  2  Approximation:     Approximation:  Close  Repair type:     Repair type:  Simple  Post-procedure details:     Dressing:  Antibiotic ointment and sterile

## 2024-07-26 ENCOUNTER — PROCEDURE VISIT (OUTPATIENT)
Dept: AUDIOLOGY | Age: 69
End: 2024-07-26

## 2024-07-26 ENCOUNTER — PROCEDURE VISIT (OUTPATIENT)
Dept: AUDIOLOGY | Age: 69
End: 2024-07-26
Payer: MEDICARE

## 2024-07-26 DIAGNOSIS — R42 DIZZINESS: ICD-10-CM

## 2024-07-26 DIAGNOSIS — H90.3 ASYMMETRIC SNHL (SENSORINEURAL HEARING LOSS): ICD-10-CM

## 2024-07-26 DIAGNOSIS — H90.3 SENSORINEURAL HEARING LOSS (SNHL) OF BOTH EARS: Primary | ICD-10-CM

## 2024-07-26 DIAGNOSIS — H93.13 TINNITUS OF BOTH EARS: ICD-10-CM

## 2024-07-26 PROCEDURE — 92567 TYMPANOMETRY: CPT

## 2024-07-26 PROCEDURE — 92557 COMPREHENSIVE HEARING TEST: CPT

## 2024-07-26 NOTE — PROGRESS NOTES
Maira Rosado   1955, 69 y.o. female   [id number]       Referring Provider: Saleem Kinsey MD  Referral Type: In an order in Epic    Reason for Visit: Evaluation of suspected change in hearing, tinnitus, or balance.    ADULT AUDIOLOGIC EVALUATION      Maira Rosado is a 69 y.o. female seen today, 7/26/2024 , for a recheck audiologic evaluation.      AUDIOLOGIC AND OTHER PERTINENT MEDICAL HISTORY:      Maira Rosado reported no significant change since last audiogram on 12/27/2023.     Case history and results from previous audiogram on 12/27/2024:     Maira Rosado noted no significant change since last audiogram on 11/13/2023     Testing revealed a normal sloping to moderately-severe sensorineural hearing loss in the right ear and a mild sloping to moderately-severe sensorineural hearing loss in the left ear.      Previous history and results from audiologic evaluation on 11/13/2023:  Maira Rosado noted aural fullness, tinnitus, dizziness, imbalance, and history of falls.    Patient reports episodic dizziness described as true vertigo, loss of balance, disequilibrium, unsteadiness, headache, nausea, vomiting  that started June 2023 (following back surgery), lasts several seconds, and often occurs with quick head/ body movement, and laying on her left side. Patient notes at least one episode of syncope in June. She states she was diagnosed with Meniere's Disease in June of 2023 as well. Patient notes her hearing is worse in the left ear. She also notes intermittent pulsatile tinnitus in the left ear.         Maira Rosado denied otalgia, otorrhea, history of significant noise exposure, history of head trauma, history of ear surgery, and family history of hearing loss.     AD:Hearing WNL sloping to Moderately-Severe SNHL, Excellent WRS, Type A tymp  AS: Mild sloping to Moderately-Severe SNHL, Excellent WRS, Type A tymp     Date: 7/26/2024     IMPRESSIONS:      Today's results revealed a Mild sloping

## 2024-07-26 NOTE — PROGRESS NOTES
Maira Rosado  1955.69 y.o. female   [id number]      HEARING AID EVALUATION    Maira Rosado was seen today, 7/26/2024 , for a Hearing Aid Evaluation following audiologic evaluation on 7/26/2024.  Maira Rosado was accompanied by her , Salvatore.  Patient has no prior history of hearing aid use. Patient was found to have no direct insurance benefit for hearing aids with access to possible hearing aid discount through third party  ([employer]). Patient understands she is responsible for any cost insurance does not cover. Hearing aid benefit worksheet scanned into media tab.     DATE: 7/26/2024     PROCEDURES:     After a discussion of evaluation results, discussion of levels of technology and prices, and lifestyle assessment. Maira Rosado has decided to pursue hearing aids. Color P3,  power 0M, small vented dome right, small power dome left. Signed medical evaluation waiver and evaluation agreement.    Technology to be ordered: Phonak L90-RL.        Patient cost due at time of fitting: $4900.00    No charge for today's appointment.    Discussed with patient that any portion of the total cost that is not paid by insurance will be the patient's financial responsibility. Estimated insurance coverage is a verification of benefit and not a guarantee.     PATIENT EDUCATION:      - Verbally reviewed benefits and limitations of devices and available features in hearing aids.    - Verbally discussed realistic expectations of hearing aid use     RECOMMENDATIONS:      - Return in 2 weeks to be fit with hearing aids.      Corby Zelaya  Audiologist      NO CHARGE

## 2024-08-09 ENCOUNTER — PROCEDURE VISIT (OUTPATIENT)
Dept: AUDIOLOGY | Age: 69
End: 2024-08-09

## 2024-08-09 DIAGNOSIS — H90.3 SENSORINEURAL HEARING LOSS (SNHL) OF BOTH EARS: Primary | ICD-10-CM

## 2024-08-09 DIAGNOSIS — H90.3 ASYMMETRIC SNHL (SENSORINEURAL HEARING LOSS): ICD-10-CM

## 2024-08-09 NOTE — PROGRESS NOTES
Maira Rosado   1955, 69 y.o. female   [id number]     HEARING AID FITTING      RIGHT EAR: /MODEL: Phonak Audeo L90-RL  SN: [id number]  EARMOLD/TUBING/WIRE/DOME: 0M 5.0  Small vented  LEFT EAR: /MODEL: Phonak Audeo L90-RL  SN: [id number]  EARMOLD/TUBING/WIRE/DOME: 0M 5.0  Small power  ACCESSORIES:  Case Go (SN: [id number]) and PartnerMic (SN: [id number]) Warranty 2025  HAF: 2024  WARRANTY: 2027  TRIAL PERIOD ENDS:2024  L&D ELIGIBLE: Yes    BUTTONS: ENABLED  PROGRAMS: DISABLED  PHONE CONNECTIVITY: DISABLED    Maira Rosado was seen today,2024,  to be fit with binaural Phonak Audeo L90-RL hearing aids.  Hearing aids were programmed to 100% target gain.  Fit is appropriate. REM completed.      RECHARGEABLE Device orientation was completed, includin. Hearing aid insertion/removal   2. Buttons/Indicators   3. Rechargeable battery use and life expectancy     4.  insertion/removal     5. Cleaning/maintenance of the device     6. Loss & Damage/Repair warranty information   7. 30-day right-to-return period    Maira Rosado noted good sound quality.  It was recommended that patient return for a follow-up appointment within the 30-day right-to-return period for further programming adjustments and education of the devices as warranted.      PATIENT EDUCATION:       Maira Rosado was provided with the Hearing Aid Fitting Checklist as a reference of items discussed at today's appointment.  Patient may find additional product information in the provided hearing aid  device manual.      Unbundled Billing- see associated fees and codes below:    Description Code Amount   Hearing Aids  $4476.50   Dispensing Fee  $300.00   Earmold Non-Custom   $24.25x2 = $48.50   Conformity Evaluation   (Real-Ear Measurements)  $75.00     Patient paid $4900.00.    Discussed with patient that any portion of the total cost that is not paid by

## 2024-08-26 ENCOUNTER — PROCEDURE VISIT (OUTPATIENT)
Dept: AUDIOLOGY | Age: 69
End: 2024-08-26

## 2024-08-26 DIAGNOSIS — H90.3 SENSORINEURAL HEARING LOSS (SNHL) OF BOTH EARS: Primary | ICD-10-CM

## 2024-08-26 DIAGNOSIS — H90.3 ASYMMETRIC SNHL (SENSORINEURAL HEARING LOSS): ICD-10-CM

## 2024-08-26 NOTE — PROGRESS NOTES
Maira Rosado  1955.69 y.o. female   [id number]    HEARING AID CHECK    RIGHT EAR: /MODEL: Phonak Audeo L90-RL  SN: [id number]  EARMOLD/TUBING/WIRE/DOME: 0M 5.0  Small vented  LEFT EAR: /MODEL: Phonak Audeo L90-RL  SN: [id number]  EARMOLD/TUBING/WIRE/DOME: 0M 5.0  Small power  ACCESSORIES:  Case Go (SN: [id number]) and PartnerMic (SN: [id number]) Warranty 8/25/2025  HAF: 8/9/2024  WARRANTY: 8/25/2027  TRIAL PERIOD ENDS:9/9/2024  L&D ELIGIBLE: Yes     BUTTONS: DISABLED  PROGRAMS: DISABLED  PHONE CONNECTIVITY: DISABLED      Maira Rosado was seen today, 8/26/2024, for a hearing aid check appointment, accompanied by her .      PROCEDURES:       Patient noted that the hearing aids have been great overall. She stated that she finds herself changing the volume of the devices frequently, however a pattern for when she was changing the volume could not be established. Patient noted that it will sometimes be too loud, but she is unsure if she has hit the button then. She reported that today, the left hearing aid did not seem to be working.     A visual inspection revealed that the left hearing aid was turned off. Discussed that it could have been a fluke in which the device did not turn on when removing it from the , or that the button could have accidentally been hit.      Demonstrated cleaning and changing domes and wax guards. Patient was able to change the dome and wax guard successfully.  Discussed that the left and right domes are different. Discussed that feedback while inserting the hearing aids is normal, but should not occur when nothing is near the devices.    Connected devices to fitting software. Datalogging revealed 7 hours of use per day. Buttons were disabled so patient can no longer change volume. Discussed that if at her next appointment, the volume is still too loud, we can adjust volume then.       PATIENT EDUCATION:     - Verbally and visually  reviewed importance of consistent use and care and maintenance of devices.      Information was verbally shared with patient during appointment.        RECOMMENDATIONS:     Continue consistent hearing aid use  Return for a hearing aid check in 1 month or sooner as needed  Retest hearing as medically indicated and/or sooner if a change in hearing is noted.  Contact audiologist with questions/concerns as needed      **No charge for today's appointment; patient utilized 1 out of 3 follow-up visits.       Corby Zelaya  Audiologist

## 2024-09-23 ENCOUNTER — PROCEDURE VISIT (OUTPATIENT)
Dept: AUDIOLOGY | Age: 69
End: 2024-09-23

## 2024-09-23 DIAGNOSIS — H90.3 ASYMMETRIC SNHL (SENSORINEURAL HEARING LOSS): ICD-10-CM

## 2024-09-23 DIAGNOSIS — H90.3 SENSORINEURAL HEARING LOSS (SNHL) OF BOTH EARS: Primary | ICD-10-CM

## 2024-09-26 ENCOUNTER — TELEPHONE (OUTPATIENT)
Dept: INTERNAL MEDICINE CLINIC | Age: 69
End: 2024-09-26

## 2024-10-10 NOTE — PROGRESS NOTES
Maira Rosado   1955, 69 y.o. female   [id number]     HEARING AID FITTING - UPGRADE TO [employer]      RIGHT EAR: /MODEL: Phonak Audeo L22-Plqhcq  SN: [id number]  EARMOLD/TUBING/WIRE/DOME: 0M 6.0  Medium Vented   LEFT EAR: /MODEL: Phonak Audeo R51-Zvgwgs  SN: [id number]  EARMOLD/TUBING/WIRE/DOME: 0M 6.0  Medium Vented   ACCESSORIES:  [employer] (SN: [id number])  HAF: 10/11/2024  WARRANTY: 10/23/2027  TRIAL PERIOD ENDS:2024  L&D ELIGIBLE: Yes    BUTTONS: DISABLED  PROGRAMS: DISABLED  PHONE CONNECTIVITY: DISABLED    Maira Rosado was seen today,10/11/2024,  to be fit with upgraded Phonak Audeo Z52-Frffvw hearing aids.  Programming was imported from her [employer] devices.     RECHARGEABLE Device orientation was completed, includin. Hearing aid insertion/removal   2. Buttons/Indicators   3. Rechargeable battery use and life expectancy     4.  insertion/removal     5. Cleaning/maintenance of the device     6. Loss & Damage/Repair warranty information   7. 30-day right-to-return period    Maira Rosado noted good sound quality.  It was recommended that patient return for a follow-up appointment within the 30-day right-to-return period for further programming adjustments and education of the devices as warranted.      PATIENT EDUCATION:       Maira Rosado was provided with the Hearing Aid Fitting Checklist as a reference of items discussed at today's appointment.  Patient may find additional product information in the provided hearing aid  device manual.      Unbundled Billing- see associated fees and codes below:    Description Code Amount   Hearing Aids  $4476.50   Dispensing Fee  $300   Earmold Non-Custom  $24.25x2 = $48.50   Conformity Evaluation   (Real-Ear Measurements)  $75.00       Patient paid $0.00 as it is an upgrade. Patient paid for the devices on 2024.    Discussed with patient that any portion of the

## 2024-10-11 ENCOUNTER — PROCEDURE VISIT (OUTPATIENT)
Dept: AUDIOLOGY | Age: 69
End: 2024-10-11

## 2024-10-11 DIAGNOSIS — H90.3 ASYMMETRIC SNHL (SENSORINEURAL HEARING LOSS): ICD-10-CM

## 2024-10-11 DIAGNOSIS — H90.3 SENSORINEURAL HEARING LOSS (SNHL) OF BOTH EARS: Primary | ICD-10-CM

## 2025-06-12 PROBLEM — S92.352A CLOSED FRACTURE OF BASE OF FIFTH METATARSAL BONE OF LEFT FOOT: Status: RESOLVED | Noted: 2023-11-29 | Resolved: 2025-06-12

## 2025-06-12 PROBLEM — R42 DIZZINESS: Status: RESOLVED | Noted: 2023-06-04 | Resolved: 2025-06-12

## 2025-06-12 NOTE — PROGRESS NOTES
has never used smokeless tobacco.. Discussed with patient the risks and benefits of screening, including over-diagnosis, false positive rate, and total radiation exposure.  The patient currently exhibits no signs or symptoms suggestive of lung cancer.  Discussed with patient the importance of compliance with yearly annual lung cancer screenings and willingness to undergo diagnosis and treatment if screening scan is positive.  In addition, the patient was counseled regarding the importance of remaining smoke free and/or total smoking cessation.    Also reviewed the following if the patient has Medicare that as of February 10, 2022, Medicare only covers LDCT screening in patients aged 50-77 with at least a 20 pack-year smoking history who currently smoke or have quit in the last 15 years

## 2025-06-13 ENCOUNTER — OFFICE VISIT (OUTPATIENT)
Dept: FAMILY MEDICINE CLINIC | Age: 70
End: 2025-06-13

## 2025-06-13 VITALS
OXYGEN SATURATION: 100 % | HEIGHT: 64 IN | WEIGHT: 113.8 LBS | BODY MASS INDEX: 19.43 KG/M2 | TEMPERATURE: 97.4 F | HEART RATE: 86 BPM | DIASTOLIC BLOOD PRESSURE: 80 MMHG | SYSTOLIC BLOOD PRESSURE: 122 MMHG

## 2025-06-13 DIAGNOSIS — K51.90 ULCERATIVE COLITIS WITHOUT COMPLICATIONS, UNSPECIFIED LOCATION (HCC): ICD-10-CM

## 2025-06-13 DIAGNOSIS — M81.0 AGE-RELATED OSTEOPOROSIS WITHOUT CURRENT PATHOLOGICAL FRACTURE: ICD-10-CM

## 2025-06-13 DIAGNOSIS — Z87.891 PERSONAL HISTORY OF TOBACCO USE: ICD-10-CM

## 2025-06-13 DIAGNOSIS — R41.3 MEMORY LOSS: ICD-10-CM

## 2025-06-13 DIAGNOSIS — F17.200 CURRENT SMOKER: ICD-10-CM

## 2025-06-13 DIAGNOSIS — E78.2 MIXED HYPERLIPIDEMIA: Primary | ICD-10-CM

## 2025-06-13 SDOH — ECONOMIC STABILITY: FOOD INSECURITY: WITHIN THE PAST 12 MONTHS, YOU WORRIED THAT YOUR FOOD WOULD RUN OUT BEFORE YOU GOT MONEY TO BUY MORE.: NEVER TRUE

## 2025-06-13 SDOH — ECONOMIC STABILITY: FOOD INSECURITY: WITHIN THE PAST 12 MONTHS, THE FOOD YOU BOUGHT JUST DIDN'T LAST AND YOU DIDN'T HAVE MONEY TO GET MORE.: NEVER TRUE

## 2025-06-13 ASSESSMENT — PATIENT HEALTH QUESTIONNAIRE - PHQ9
1. LITTLE INTEREST OR PLEASURE IN DOING THINGS: NOT AT ALL
SUM OF ALL RESPONSES TO PHQ QUESTIONS 1-9: 0
6. FEELING BAD ABOUT YOURSELF - OR THAT YOU ARE A FAILURE OR HAVE LET YOURSELF OR YOUR FAMILY DOWN: NOT AT ALL
SUM OF ALL RESPONSES TO PHQ QUESTIONS 1-9: 0
4. FEELING TIRED OR HAVING LITTLE ENERGY: NOT AT ALL
3. TROUBLE FALLING OR STAYING ASLEEP: NOT AT ALL
SUM OF ALL RESPONSES TO PHQ QUESTIONS 1-9: 0
SUM OF ALL RESPONSES TO PHQ QUESTIONS 1-9: 0
9. THOUGHTS THAT YOU WOULD BE BETTER OFF DEAD, OR OF HURTING YOURSELF: NOT AT ALL
10. IF YOU CHECKED OFF ANY PROBLEMS, HOW DIFFICULT HAVE THESE PROBLEMS MADE IT FOR YOU TO DO YOUR WORK, TAKE CARE OF THINGS AT HOME, OR GET ALONG WITH OTHER PEOPLE: NOT DIFFICULT AT ALL
2. FEELING DOWN, DEPRESSED OR HOPELESS: NOT AT ALL
5. POOR APPETITE OR OVEREATING: NOT AT ALL
8. MOVING OR SPEAKING SO SLOWLY THAT OTHER PEOPLE COULD HAVE NOTICED. OR THE OPPOSITE, BEING SO FIGETY OR RESTLESS THAT YOU HAVE BEEN MOVING AROUND A LOT MORE THAN USUAL: NOT AT ALL
7. TROUBLE CONCENTRATING ON THINGS, SUCH AS READING THE NEWSPAPER OR WATCHING TELEVISION: NOT AT ALL

## 2025-06-13 ASSESSMENT — ENCOUNTER SYMPTOMS
DIARRHEA: 0
SHORTNESS OF BREATH: 0
CONSTIPATION: 0
WHEEZING: 0
BACK PAIN: 1

## 2025-06-13 NOTE — PATIENT INSTRUCTIONS
Need fasting blood work  Need lung screening  Need dexa scan  Refer to neurology  Need annual wellness exam         Learning About Lung Cancer Screening  What is screening for lung cancer?     Lung cancer screening is a way to find some lung cancers early, before a person has any symptoms of the cancer.  Lung cancer screening may help those who have the highest risk for lung cancer--people age 50 and older who are or were heavy smokers. For most people, who aren't at increased risk, screening for lung cancer probably isn't helpful.  Screening won't prevent cancer. And it may not find all lung cancers. Lung cancer screening may lower the risk of dying from lung cancer in a small number of people.  How is it done?  Lung cancer screening is done with a low-dose CT (computed tomography) scan. A CT scan uses X-rays, or radiation, to make detailed pictures of your body. Experts recommend that screening be done in medical centers that focus on finding and treating lung cancer.  Who is screening recommended for?  Lung cancer screening is recommended for people age 50 and older who are or were heavy smokers. That means people with a smoking history of at least 20 pack years. A pack year is a way to measure how heavy a smoker you are or were.  To figure out your pack years, multiply how many packs a day on average (assuming 20 cigarettes per pack) you have smoked by how many years you have smoked. For example:  If you smoked 1 pack a day for 20 years, that's 1 times 20. So you have a smoking history of 20 pack years.  If you smoked 2 packs a day for 10 years, that's 2 times 10. So you have a smoking history of 20 pack years.  Experts agree that screening is for people who have a high risk of lung cancer. But experts don't agree on what high risk means. Some say people age 50 or older with at least a 20-pack-year smoking history are high risk. Others say it's people age 55 or older with a 30-pack-year history.  To see if you

## 2025-06-24 ENCOUNTER — TELEPHONE (OUTPATIENT)
Dept: FAMILY MEDICINE CLINIC | Age: 70
End: 2025-06-24

## (undated) DEVICE — ELECTRODE PT RET AD L9FT HI MOIST COND ADH HYDRGEL CORDED

## (undated) DEVICE — GLOVE SURG SZ 65 THK91MIL LTX FREE SYN POLYISOPRENE

## (undated) DEVICE — NERVE BLOCK TRAY: Brand: MEDLINE INDUSTRIES, INC.

## (undated) DEVICE — MAJOR SET UP PK

## (undated) DEVICE — INTENDED FOR TISSUE SEPARATION, AND OTHER PROCEDURES THAT REQUIRE A SHARP SURGICAL BLADE TO PUNCTURE OR CUT.: Brand: BARD-PARKER ® STAINLESS STEEL BLADES

## (undated) DEVICE — EPIDURAL TRAY: Brand: MEDLINE INDUSTRIES, INC.

## (undated) DEVICE — STRIP,CLOSURE,WOUND,MEDI-STRIP,1/2X4: Brand: MEDLINE

## (undated) DEVICE — MARKER SKIN MINI PUR FINE REGULAR TIP W RUL XL PREP RESIST

## (undated) DEVICE — SHEET,DRAPE,53X77,STERILE: Brand: MEDLINE

## (undated) DEVICE — T-DRAPE,EXTREMITY,STERILE: Brand: MEDLINE

## (undated) DEVICE — MERCY HEALTH WEST TURNOVER: Brand: MEDLINE INDUSTRIES, INC.

## (undated) DEVICE — BIT DRL L100MM DIA2.5MM FOR SM FRAG UNIV LOK SYS

## (undated) DEVICE — Device

## (undated) DEVICE — PENCIL ES L3M BTTN SWCH S STL HEX LOK BLDE ELECTRD HOLSTER

## (undated) DEVICE — COVER LT HNDL BLU PLAS

## (undated) DEVICE — SOLUTION IV IRRIG POUR BRL 0.9% SODIUM CHL 2F7124

## (undated) DEVICE — K WIRE FIX L150MM DIA1.6MM TRCR PNT 1 END FOR SM FRAG UNIV
Type: IMPLANTABLE DEVICE | Site: ANKLE | Status: NON-FUNCTIONAL
Removed: 2020-12-14

## (undated) DEVICE — SYRINGE MED 10ML LUERLOCK TIP W/O SFTY DISP

## (undated) DEVICE — DRESSING,GAUZE,XEROFORM,CURAD,1"X8",ST: Brand: CURAD

## (undated) DEVICE — AVANOS* TUOHY EPIDURAL NEEDLE: Brand: AVANOS

## (undated) DEVICE — GLOVE SURG 9 PF CRM STRL SENSICARE PI MIC LF

## (undated) DEVICE — SPONGE GZ W4XL4IN COT 12 PLY TYP VII WVN C FLD DSGN

## (undated) DEVICE — DRAPE,U/SHT,SPLIT,FILM,60X84,STERILE: Brand: MEDLINE

## (undated) DEVICE — SUTURE VCRL SZ 3-0 L18IN ABSRB UD L26MM SH 1/2 CIR J864D

## (undated) DEVICE — DECANTER BAG 9": Brand: MEDLINE INDUSTRIES, INC.

## (undated) DEVICE — SUTURE MCRYL SZ 4-0 L18IN ABSRB UD L19MM PS-2 3/8 CIR PRIM Y496G

## (undated) DEVICE — APPLICATOR MEDICATED 26 CC SOLUTION HI LT ORNG CHLORAPREP

## (undated) DEVICE — FORCEPS BX 240CM 2.4MM L NDL RAD JAW 4 M00513334

## (undated) DEVICE — 3M™ STERI-STRIP™ COMPOUND BENZOIN TINCTURE 40 BAGS/CARTON 4 CARTONS/CASE C1544: Brand: 3M™ STERI-STRIP™

## (undated) DEVICE — BANDAGE COMPR W6INXL12FT SMOOTH FOR LIMB EXSANG ESMARCH

## (undated) DEVICE — TUBING, SUCTION, 1/4" X 12', STRAIGHT: Brand: MEDLINE

## (undated) DEVICE — MINOR SET UP PK

## (undated) DEVICE — PADDING UNDERCAST W4INXL4YD 100% COT CRIMPED FINISH WBRL II

## (undated) DEVICE — NEEDLE HYPO 23GA L1.5IN TURQ POLYPR HUB S STL THN WALL IM

## (undated) DEVICE — SUTURE MCRYL SZ 4-0 L27IN ABSRB UD L19MM PS-2 1/2 CIR PRIM Y426H

## (undated) DEVICE — GLOVE SURG SZ 65 L12IN FNGR THK79MIL GRN LTX FREE

## (undated) DEVICE — NEEDLE HYPO 22GA L1 1/2IN PIVOTING SHLD FOR LUERLOCK SYR

## (undated) DEVICE — TOWEL,OR,DSP,ST,BLUE,STD,4/PK,20PK/CS: Brand: MEDLINE

## (undated) DEVICE — BIT DRL DIA2.7MM CANN QUIK CPL

## (undated) DEVICE — SUTURE VCRL SZ 2-0 L18IN ABSRB UD CT-1 L36MM 1/2 CIR J839D

## (undated) DEVICE — K WIRE FIX L150MM DIA2MM S STL SMOOTH SGL SHRP TIP
Type: IMPLANTABLE DEVICE | Site: ANKLE | Status: NON-FUNCTIONAL
Removed: 2020-12-14

## (undated) DEVICE — GOWN SIRUS NONREIN XL W/TWL: Brand: MEDLINE INDUSTRIES, INC.

## (undated) DEVICE — GLOVE SURG SZ 8 CRM LTX FREE POLYISOPRENE POLYMER BEAD ANTI

## (undated) DEVICE — DRAPE C ARM UNIV W41XL74IN CLR PLAS XR VELC CLSR POLY STRP

## (undated) DEVICE — BANDAGE COMPR W4INXL15FT BGE E SGL LAYERED CLP CLSR

## (undated) DEVICE — CANISTER, RIGID, 1200CC: Brand: MEDLINE INDUSTRIES, INC.

## (undated) DEVICE — NEEDLE QUINCKE 22GX5": Brand: MEDLINE

## (undated) DEVICE — COTTON UNDERCAST PADDING,CRIMPED FINISH: Brand: WEBRIL

## (undated) DEVICE — GOWN SIRUS NONREIN LG W/TWL: Brand: MEDLINE INDUSTRIES, INC.

## (undated) DEVICE — BIT DRL DIA2MM STD QUIK CONN FOR PERIARTC LOK PLT SYS

## (undated) DEVICE — Z DISCONTINUED PER MEDLINE (LOW STOCK)  USE 2422770 DRAPE C ARM W54XL78IN FOR FLROSCN

## (undated) DEVICE — SPLINT OCL 2 PLSTR SPLNTING SYS 15 LAYR 4INX 20FT

## (undated) DEVICE — 3M™ STERI-DRAPE™ U-DRAPE 1015: Brand: STERI-DRAPE™

## (undated) DEVICE — BANDAGE COMPR W4INXL12FT E DISP ESMARCH EVEN

## (undated) DEVICE — SYRINGE IRRIG 60ML SFT PLIABLE BLB EZ TO GRP 1 HND USE W/